# Patient Record
Sex: MALE | Race: WHITE | Employment: FULL TIME | URBAN - METROPOLITAN AREA
[De-identification: names, ages, dates, MRNs, and addresses within clinical notes are randomized per-mention and may not be internally consistent; named-entity substitution may affect disease eponyms.]

---

## 2017-07-09 ENCOUNTER — HOSPITAL ENCOUNTER (EMERGENCY)
Age: 32
Discharge: HOME | End: 2017-07-09
Attending: EMERGENCY MEDICINE
Payer: COMMERCIAL

## 2017-07-09 VITALS
OXYGEN SATURATION: 96 % | WEIGHT: 299.38 LBS | HEART RATE: 92 BPM | SYSTOLIC BLOOD PRESSURE: 130 MMHG | BODY MASS INDEX: 44.34 KG/M2 | DIASTOLIC BLOOD PRESSURE: 87 MMHG | HEIGHT: 69 IN | RESPIRATION RATE: 18 BRPM | TEMPERATURE: 98.3 F

## 2017-07-09 DIAGNOSIS — M54.50 ACUTE MIDLINE LOW BACK PAIN WITHOUT SCIATICA: ICD-10-CM
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
## 2017-07-09 DIAGNOSIS — L02.214 ABSCESS OF RIGHT GROIN: ICD-10-CM

## 2017-07-09 DIAGNOSIS — M54.6 ACUTE MIDLINE THORACIC BACK PAIN: Primary | ICD-10-CM

## 2017-07-09 DIAGNOSIS — R20.2 PARESTHESIA: ICD-10-CM

## 2017-07-09 PROCEDURE — 99283 EMERGENCY DEPT VISIT LOW MDM: CPT

## 2017-07-09 PROCEDURE — 75810000289 HC I&D ABSCESS SIMP/COMP/MULT

## 2017-07-09 PROCEDURE — 87205 SMEAR GRAM STAIN: CPT | Performed by: EMERGENCY MEDICINE

## 2017-07-09 PROCEDURE — 74011250637 HC RX REV CODE- 250/637: Performed by: EMERGENCY MEDICINE

## 2017-07-09 PROCEDURE — 77030019895 HC PCKNG STRP IODO -A

## 2017-07-09 PROCEDURE — 74011636637 HC RX REV CODE- 636/637: Performed by: EMERGENCY MEDICINE

## 2017-07-09 PROCEDURE — 74011000250 HC RX REV CODE- 250: Performed by: EMERGENCY MEDICINE

## 2017-07-09 RX ORDER — METHYLPREDNISOLONE 4 MG/1
TABLET ORAL
Qty: 1 DOSE PACK | Refills: 0 | Status: SHIPPED | OUTPATIENT
Start: 2017-07-09 | End: 2017-12-28

## 2017-07-09 RX ORDER — BACLOFEN 10 MG/1
10 TABLET ORAL AS NEEDED
COMMUNITY
End: 2021-09-18

## 2017-07-09 RX ORDER — DOXYCYCLINE HYCLATE 100 MG
100 TABLET ORAL EVERY 12 HOURS
Status: DISCONTINUED | OUTPATIENT
Start: 2017-07-09 | End: 2017-07-10 | Stop reason: HOSPADM

## 2017-07-09 RX ORDER — GABAPENTIN 300 MG/1
300 CAPSULE ORAL
COMMUNITY
End: 2021-09-18

## 2017-07-09 RX ORDER — LIDOCAINE HYDROCHLORIDE AND EPINEPHRINE 10; 10 MG/ML; UG/ML
5 INJECTION, SOLUTION INFILTRATION; PERINEURAL ONCE
Status: COMPLETED | OUTPATIENT
Start: 2017-07-09 | End: 2017-07-09

## 2017-07-09 RX ORDER — DOXYCYCLINE HYCLATE 100 MG
100 TABLET ORAL 2 TIMES DAILY
Qty: 14 TAB | Refills: 0 | Status: SHIPPED | OUTPATIENT
Start: 2017-07-09 | End: 2017-07-16

## 2017-07-09 RX ORDER — PREDNISONE 20 MG/1
60 TABLET ORAL
Status: COMPLETED | OUTPATIENT
Start: 2017-07-09 | End: 2017-07-09

## 2017-07-09 RX ADMIN — LIDOCAINE HYDROCHLORIDE,EPINEPHRINE BITARTRATE 50 MG: 10; .01 INJECTION, SOLUTION INFILTRATION; PERINEURAL at 22:34

## 2017-07-09 RX ADMIN — DOXYCYCLINE HYCLATE 100 MG: 100 TABLET, COATED ORAL at 23:19

## 2017-07-09 RX ADMIN — PREDNISONE 60 MG: 20 TABLET ORAL at 23:19

## 2017-07-09 NOTE — LETTER
[facility] EMERGENCY DEPT 
[address] [facility] [place] [address] [id number] 
[phone] Work/School Note Date: 7/9/2017 To Whom It May concern: 
 
Adry Jordan was seen and treated today in the emergency room by the following provider(s): 
Attending Provider: Jovanny Bee MD. Adry Jordan may return to work on in[place].  
 
Sincerely, 
 
 
 
 
Jovanny Bee MD

## 2017-07-10 NOTE — ED NOTES
The patient was discharged home by Dr. Jeanie Vuong in stable condition. The patient is alert and oriented, in no respiratory distress and discharge vital signs obtained. The patient's diagnosis, condition and treatment were explained. The patient expressed understanding. Two prescriptions given. A work note given. A discharge plan has been developed. A  was not involved in the process. Aftercare instructions were given. Pt ambulatory out of the ED.
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
## 2017-07-10 NOTE — ED TRIAGE NOTES
Pt ambulatory to treatment area, pt states \"I fell on my butt and Im having a lot of pain. \" Pt reports previous herniated disc that he takes baclofen and gabapentin for. Pt states that these medications did not help. Pt states \"Im in a lot of pain but I dont want pain medication. Denies loss of bowel or bladder fuction.

## 2017-07-10 NOTE — ED NOTES
Hourly rounding completed. Ongoing plan of care discussed and pts concerns/questions addressed. Call bell within reach; will continue to monitor.

## 2017-07-11 ENCOUNTER — HOSPITAL ENCOUNTER (EMERGENCY)
Age: 32
Discharge: HOME | End: 2017-07-11
Attending: EMERGENCY MEDICINE
Payer: COMMERCIAL

## 2017-07-11 VITALS
OXYGEN SATURATION: 96 % | BODY MASS INDEX: 44.14 KG/M2 | DIASTOLIC BLOOD PRESSURE: 85 MMHG | SYSTOLIC BLOOD PRESSURE: 133 MMHG | TEMPERATURE: 98.3 F | HEART RATE: 91 BPM | HEIGHT: 69 IN | WEIGHT: 298 LBS | RESPIRATION RATE: 20 BRPM

## 2017-07-11 DIAGNOSIS — L02.91 ABSCESS: Primary | ICD-10-CM

## 2017-07-11 PROCEDURE — 74011000250 HC RX REV CODE- 250: Performed by: PHYSICIAN ASSISTANT

## 2017-07-11 PROCEDURE — 75810000275 HC EMERGENCY DEPT VISIT NO LEVEL OF CARE

## 2017-07-11 RX ORDER — BACITRACIN 500 UNIT/G
1 PACKET (EA) TOPICAL
Status: COMPLETED | OUTPATIENT
Start: 2017-07-11 | End: 2017-07-11

## 2017-07-11 RX ADMIN — BACITRACIN 1 PACKET: 500 OINTMENT TOPICAL at 17:30

## 2017-07-11 NOTE — ED NOTES
Pt was discharged and given instructions by Krishna Pendleton. Pt verbalized good understanding of all discharge instructions,prescriptions and F/U care. All questions answered. Pt in stable condition on discharge.

## 2017-07-11 NOTE — ED PROVIDER NOTES
HPI Comments: 34year old male presenting for abscess wound check. Denies increasing pain, drainage, fever. Notes that amount of drainage on dressing has significantly improved in the last 24 hours. No fever or other systemic symptoms. PMHx: HTN, back pain  Social: + tobacco use    Patient is a 34 y.o. male presenting with wound check. The history is provided by the patient. Wound Check    This is a new problem. Episode onset: had I&D done here 2 days ago. The problem has been rapidly improving. Pain location: right upper inner thigh. The pain is at a severity of 3/10. The pain is mild. The symptoms are aggravated by palpation. Past Medical History:   Diagnosis Date    Hypertension     Ill-defined condition 03/2017    herniated disk    Ill-defined condition 03/2017    T-4 cracks leaking fluid, but no rupture       History reviewed. No pertinent surgical history. History reviewed. No pertinent family history. Social History     Social History    Marital status: LEGALLY      Spouse name: N/A    Number of children: N/A    Years of education: N/A     Occupational History    Not on file. Social History Main Topics    Smoking status: Current Every Day Smoker     Packs/day: 0.50    Smokeless tobacco: Former User    Alcohol use Yes      Comment: occasionally    Drug use: No    Sexual activity: Not on file     Other Topics Concern    Not on file     Social History Narrative         ALLERGIES: Codeine; Phenergan [promethazine]; and Toradol [ketorolac tromethamine]    Review of Systems   Constitutional: Negative for chills and fever. Cardiovascular: Negative for chest pain. Gastrointestinal: Negative for vomiting. Skin: Positive for wound. Negative for color change. All other systems reviewed and are negative.       Vitals:    07/11/17 1635   BP: 133/85   Pulse: 91   Resp: 20   Temp: 98.3 °F (36.8 °C)   SpO2: 96%   Weight: 135.2 kg (298 lb)   Height: 5' 9\" (1.753 m) Physical Exam   Constitutional: He is oriented to person, place, and time. He appears well-developed and well-nourished. No distress. Pleasant WM   HENT:   Head: Normocephalic and atraumatic. Right Ear: External ear normal.   Left Ear: External ear normal.   Eyes: Conjunctivae are normal. No scleral icterus. Neck: Neck supple. No tracheal deviation present. Cardiovascular: Normal rate. Pulmonary/Chest: Effort normal. No stridor. No respiratory distress. Abdominal: Soft. He exhibits no distension. Musculoskeletal: Normal range of motion. Neurological: He is alert and oriented to person, place, and time. Skin: Skin is warm and dry. Abscess cavity with packing to the right upper inner thigh. Appears to be healing well. No erythema. No fluctuance. Small cavity without drainage noted. Psychiatric: He has a normal mood and affect. His behavior is normal.   Nursing note and vitals reviewed. MDM  Number of Diagnoses or Management Options  Abscess:   Diagnosis management comments: 34year old male presenting to the ED for abscess check. Had I&D here two days ago with packing placed. Reports feeling better. Appears to be healing well. Discussed with patient local wound care, return precautions, PCP f/u. Amount and/or Complexity of Data Reviewed  Discuss the patient with other providers: yes (Dr. Kristie Guadarrama, ED attending)      ED Course       Procedures      Packing removed from wound. No surrounding erythema, warmth, or tenderness. No drainage in the cavity. Dressing applied last night with small, dime sized area of drainage. Interior appears pink and to be healing well.   MG Ryder  5:51 PM

## 2017-07-11 NOTE — ED TRIAGE NOTES
Pt ambulated to the treatment area with a steady gait. Pt states \"I had a boil in my upper right leg treated Sunday I am here to have the packing removed and wound checked. \" Pt denies fever.

## 2017-07-11 NOTE — DISCHARGE INSTRUCTIONS
We hope that we have addressed all of your medical concerns. The examination and treatment you received in the Emergency Department were for an emergent problem and were not intended as complete care. It is important that you follow up with your healthcare provider(s) for ongoing care. If your symptoms worsen or do not improve as expected, and you are unable to reach your usual health care provider(s), you should return to the Emergency Department. Today's healthcare is undergoing tremendous change, and patient satisfaction surveys are one of the many tools to assess the quality of medical care. You may receive a survey from the [employer] regarding your experience in the Emergency Department. I hope that your experience has been completely positive, particularly the medical care that I provided. As such, please participate in the survey; anything less than excellent does not meet my expectations or intentions. [facility] and [facility] participate in nationally recognized quality of care measures. If your blood pressure is greater than 120/80, as reported below, we urge that you seek medical care to address the potential of high blood pressure, commonly known as hypertension. Hypertension can be hereditary or can be caused by certain medical conditions, pain, stress, or \"white coat syndrome. \"       Please make an appointment with your health care provider(s) for follow up of your Emergency Department visit. VITALS:   Patient Vitals for the past 8 hrs:   Temp Pulse Resp BP SpO2   07/11/17 1635 98.3 °F (36.8 °C) 91 20 133/85 96 %          Thank you for allowing us to provide you with medical care today. We realize that you have many choices for your emergency care needs. Please choose us in the future for any continued health care needs. Jose Antunez, [address] [place] [address].   Office: [phone]            No results found for this or any previous visit (from the past 24 hour(s)). No results found. Skin Abscess: Care Instructions  Your Care Instructions    A skin abscess is a bacterial infection that forms a pocket of pus. A boil is a kind of skin abscess. The doctor may have cut an opening in the abscess so that the pus can drain out. You may have gauze in the cut so that the abscess will stay open and keep draining. You may need antibiotics. You will need to follow up with your doctor to make sure the infection has gone away. The doctor has checked you carefully, but problems can develop later. If you notice any problems or new symptoms, get medical treatment right away. Follow-up care is a key part of your treatment and safety. Be sure to make and go to all appointments, and call your doctor if you are having problems. It's also a good idea to know your test results and keep a list of the medicines you take. How can you care for yourself at home? · Apply warm and dry compresses, a heating pad set on low, or a hot water bottle 3 or 4 times a day for pain. Keep a cloth between the heat source and your skin. · If your doctor prescribed antibiotics, take them as directed. Do not stop taking them just because you feel better. You need to take the full course of antibiotics. · Take pain medicines exactly as directed. ¨ If the doctor gave you a prescription medicine for pain, take it as prescribed. ¨ If you are not taking a prescription pain medicine, ask your doctor if you can take an over-the-counter medicine. · Keep your bandage clean and dry. Change the bandage whenever it gets wet or dirty, or at least one time a day. · If the abscess was packed with gauze:  ¨ Keep follow-up appointments to have the gauze changed or removed. If the doctor instructed you to remove the gauze, gently pull out all of the gauze when your doctor tells you to.   ¨ After the gauze is removed, soak the area in warm water for 15 to 20 minutes 2 times a day, until the wound closes. When should you call for help? Call your doctor now or seek immediate medical care if:  · You have signs of worsening infection, such as:  ¨ Increased pain, swelling, warmth, or redness. ¨ Red streaks leading from the infected skin. ¨ Pus draining from the wound. ¨ A fever. Watch closely for changes in your health, and be sure to contact your doctor if:  · You do not get better as expected. Where can you learn more? Go to http://eusebio-jess.info/. Enter H475 in the search box to learn more about \"Skin Abscess: Care Instructions. \"  Current as of: October 13, 2016  Content Version: 11.3  © 6336-1745 [employer]. Care instructions adapted under license by [employer] (which disclaims liability or warranty for this information). If you have questions about a medical condition or this instruction, always ask your healthcare professional. [address] any warranty or liability for your use of this information.

## 2017-07-13 LAB
BACTERIA SPEC CULT: ABNORMAL
BACTERIA SPEC CULT: ABNORMAL
GRAM STN SPEC: ABNORMAL
GRAM STN SPEC: ABNORMAL
SERVICE CMNT-IMP: ABNORMAL

## 2017-12-28 ENCOUNTER — HOSPITAL ENCOUNTER (EMERGENCY)
Age: 32
Discharge: HOME | End: 2017-12-28
Attending: STUDENT IN AN ORGANIZED HEALTH CARE EDUCATION/TRAINING PROGRAM
Payer: COMMERCIAL

## 2017-12-28 ENCOUNTER — APPOINTMENT (OUTPATIENT)
Dept: GENERAL RADIOLOGY | Age: 32
End: 2017-12-28
Attending: PHYSICIAN ASSISTANT
Payer: COMMERCIAL

## 2017-12-28 VITALS
DIASTOLIC BLOOD PRESSURE: 72 MMHG | WEIGHT: 315 LBS | HEART RATE: 92 BPM | TEMPERATURE: 98.3 F | BODY MASS INDEX: 46.65 KG/M2 | OXYGEN SATURATION: 94 % | HEIGHT: 69 IN | SYSTOLIC BLOOD PRESSURE: 118 MMHG | RESPIRATION RATE: 18 BRPM

## 2017-12-28 DIAGNOSIS — J20.9 ACUTE BRONCHITIS, UNSPECIFIED ORGANISM: ICD-10-CM

## 2017-12-28 DIAGNOSIS — R07.9 CHEST PAIN IN ADULT: Primary | ICD-10-CM

## 2017-12-28 DIAGNOSIS — F17.200 NICOTINE DEPENDENCE, UNCOMPLICATED, UNSPECIFIED NICOTINE PRODUCT TYPE: ICD-10-CM

## 2017-12-28 LAB
ANION GAP BLD CALC-SCNC: 13 MMOL/L (ref 5–15)
ATRIAL RATE: 88 BPM
BASOPHILS # BLD: 0.1 K/UL (ref 0–0.1)
BASOPHILS NFR BLD: 1 % (ref 0–1)
BUN BLD-MCNC: 29 MG/DL (ref 9–20)
CA-I BLD-MCNC: 1.14 MMOL/L (ref 1.12–1.32)
CALCULATED P AXIS, ECG09: 49 DEGREES
CALCULATED R AXIS, ECG10: -10 DEGREES
CALCULATED T AXIS, ECG11: 49 DEGREES
CHLORIDE BLD-SCNC: 103 MMOL/L (ref 98–107)
CO2 BLD-SCNC: 28 MMOL/L (ref 21–32)
CREAT BLD-MCNC: 1 MG/DL (ref 0.6–1.3)
DIAGNOSIS, 93000: NORMAL
DIFFERENTIAL METHOD BLD: NORMAL
EOSINOPHIL # BLD: 0.3 K/UL (ref 0–0.4)
EOSINOPHIL NFR BLD: 3 % (ref 0–7)
ERYTHROCYTE [DISTWIDTH] IN BLOOD BY AUTOMATED COUNT: 13.1 % (ref 11.5–14.5)
GLUCOSE BLD-MCNC: 98 MG/DL (ref 65–100)
HCT VFR BLD AUTO: 45.8 % (ref 36.6–50.3)
HCT VFR BLD CALC: 45 % (ref 36.6–50.3)
HGB BLD-MCNC: 15.2 G/DL (ref 12.1–17)
HGB BLD-MCNC: 15.3 GM/DL (ref 12.1–17)
LYMPHOCYTES # BLD: 2.2 K/UL
LYMPHOCYTES NFR BLD: 23 % (ref 12–49)
MCH RBC QN AUTO: 29.2 PG (ref 26–34)
MCHC RBC AUTO-ENTMCNC: 33.2 G/DL (ref 30–36.5)
MCV RBC AUTO: 87.9 FL (ref 80–99)
MONOCYTES # BLD: 0.8 K/UL (ref 0–1)
MONOCYTES NFR BLD: 9 % (ref 5–13)
NEUTS SEG # BLD: 6.4 K/UL (ref 1.8–8)
NEUTS SEG NFR BLD: 64 % (ref 32–75)
P-R INTERVAL, ECG05: 142 MS
PLATELET # BLD AUTO: 276 K/UL (ref 150–400)
POTASSIUM BLD-SCNC: 4.8 MMOL/L (ref 3.5–5.1)
Q-T INTERVAL, ECG07: 364 MS
QRS DURATION, ECG06: 86 MS
QTC CALCULATION (BEZET), ECG08: 440 MS
RBC # BLD AUTO: 5.21 M/UL (ref 4.1–5.7)
SERVICE CMNT-IMP: ABNORMAL
SODIUM BLD-SCNC: 137 MMOL/L (ref 136–145)
TROPONIN I BLD-MCNC: <0.04 NG/ML (ref 0–0.08)
VENTRICULAR RATE, ECG03: 88 BPM
WBC # BLD AUTO: 9.7 K/UL (ref 4.1–11.1)

## 2017-12-28 PROCEDURE — 80047 BASIC METABLC PNL IONIZED CA: CPT

## 2017-12-28 PROCEDURE — 77030013140 HC MSK NEB VYRM -A

## 2017-12-28 PROCEDURE — 85025 COMPLETE CBC W/AUTO DIFF WBC: CPT | Performed by: PHYSICIAN ASSISTANT

## 2017-12-28 PROCEDURE — 94762 N-INVAS EAR/PLS OXIMTRY CONT: CPT

## 2017-12-28 PROCEDURE — 74011250637 HC RX REV CODE- 250/637: Performed by: PHYSICIAN ASSISTANT

## 2017-12-28 PROCEDURE — 36415 COLL VENOUS BLD VENIPUNCTURE: CPT | Performed by: PHYSICIAN ASSISTANT

## 2017-12-28 PROCEDURE — 71020 XR CHEST PA LAT: CPT

## 2017-12-28 PROCEDURE — 74011000250 HC RX REV CODE- 250: Performed by: PHYSICIAN ASSISTANT

## 2017-12-28 PROCEDURE — 94640 AIRWAY INHALATION TREATMENT: CPT

## 2017-12-28 PROCEDURE — 99285 EMERGENCY DEPT VISIT HI MDM: CPT

## 2017-12-28 PROCEDURE — 84484 ASSAY OF TROPONIN QUANT: CPT

## 2017-12-28 PROCEDURE — 93005 ELECTROCARDIOGRAM TRACING: CPT

## 2017-12-28 RX ORDER — SODIUM CHLORIDE 0.9 % (FLUSH) 0.9 %
5-10 SYRINGE (ML) INJECTION AS NEEDED
Status: DISCONTINUED | OUTPATIENT
Start: 2017-12-28 | End: 2017-12-28 | Stop reason: HOSPADM

## 2017-12-28 RX ORDER — VARENICLINE TARTRATE 1 MG/1
1 TABLET, FILM COATED ORAL
COMMUNITY
End: 2018-07-10

## 2017-12-28 RX ORDER — AMLODIPINE BESYLATE 5 MG/1
5 TABLET ORAL
COMMUNITY
End: 2021-09-18

## 2017-12-28 RX ORDER — GUAIFENESIN 100 MG/5ML
325 LIQUID (ML) ORAL
Status: COMPLETED | OUTPATIENT
Start: 2017-12-28 | End: 2017-12-28

## 2017-12-28 RX ORDER — ALBUTEROL SULFATE 90 UG/1
2 AEROSOL, METERED RESPIRATORY (INHALATION)
Qty: 1 INHALER | Refills: 1 | Status: SHIPPED | OUTPATIENT
Start: 2017-12-28 | End: 2021-09-18

## 2017-12-28 RX ADMIN — ALBUTEROL SULFATE 1 DOSE: 2.5 SOLUTION RESPIRATORY (INHALATION) at 13:22

## 2017-12-28 RX ADMIN — ASPIRIN 81 MG 324 MG: 81 TABLET ORAL at 12:09

## 2017-12-28 RX ADMIN — ALBUTEROL SULFATE 1 DOSE: 2.5 SOLUTION RESPIRATORY (INHALATION) at 12:10

## 2017-12-28 NOTE — ED NOTES
Patient discharged by [facility]. Patient has no questions regarding discharge at this time. IV removed without difficulty.

## 2017-12-28 NOTE — ED NOTES
Patient also reports a cold for about two weeks. Patient taking dayquil for the past week for symptoms.

## 2017-12-28 NOTE — ED NOTES
Patient has completed seconded ordered breathing treatment. Patient ambulatory to ED restroom, no assistance needed.

## 2017-12-28 NOTE — ED PROVIDER NOTES
HPI Comments: Fonda Goldmann is a 34 y.o. male who presents ambulatory to the ED with a c/o sharp chest pain to his left chest starting four hours pta. It has been constant and worsened with radiation to his left shoulder while enroute to the ER. He notes he has been having intermittent sob. Pt reports he has been having a dry cough for 2 weeks. He has been taking dayquil and has decreased his smoking from 1.5 ppd down to 0.5 ppd over the last 1.5 weeks ago. He denies f/c, n/v/d, diaphoresis or urinary sx. Pt states his father just had \"a massive heart attack\"  A little over a month ago. He denies tx for his cp pta. Pt denies recent travel, prolonged sitting, recent procedures or injury. He denies leg pain    PCP: [facility]  PMHx significant for: Past Medical History:  No date: Hypertension  03/2017: Ill-defined condition      Comment: herniated disk  03/2017: Ill-defined condition      Comment: T-4 cracks leaking fluid, but no rupture  PSHx significant for: History reviewed. No pertinent surgical history. Social Hx: Tobacco: down to 0.5 ppd  EtOH: quit 1.5 years ago Illicit drug use: denies    There are no further complaints or symptoms at this time. Past Medical History:   Diagnosis Date    Hypertension     Ill-defined condition 03/2017    herniated disk    Ill-defined condition 03/2017    T-4 cracks leaking fluid, but no rupture       History reviewed. No pertinent surgical history. History reviewed. No pertinent family history. Social History     Social History    Marital status:      Spouse name: N/A    Number of children: N/A    Years of education: N/A     Occupational History    Not on file.      Social History Main Topics    Smoking status: Current Every Day Smoker     Packs/day: 0.50    Smokeless tobacco: Former User    Alcohol use No      Comment: denies drinking for a year and a half    Drug use: No    Sexual activity: Not on file     Other Topics Concern    Not on file     Social History Narrative         ALLERGIES: Codeine; Phenergan [promethazine]; and Toradol [ketorolac tromethamine]    Review of Systems   Constitutional: Negative for chills and fever. HENT: Negative for congestion, rhinorrhea, sneezing and sore throat. Eyes: Negative for redness and visual disturbance. Respiratory: Positive for cough and shortness of breath. Cardiovascular: Positive for chest pain. Negative for leg swelling. Gastrointestinal: Negative for abdominal pain, nausea and vomiting. Genitourinary: Negative for difficulty urinating and frequency. Musculoskeletal: Negative for back pain, myalgias and neck stiffness. Skin: Negative for rash. Neurological: Negative for dizziness, syncope, weakness and headaches. Hematological: Negative for adenopathy. Patient Vitals for the past 12 hrs:   Temp Pulse Resp BP SpO2   12/28/17 1422 - 92 18 - 94 %   12/28/17 1345 - 94 - 118/72 94 %   12/28/17 1336 - 90 13 - 94 %   12/28/17 1315 - 85 18 120/65 95 %   12/28/17 1304 - 85 15 - 94 %   12/28/17 1300 - 93 24 127/74 91 %   12/28/17 1245 - 94 21 119/72 92 %   12/28/17 1240 - 94 18 - 94 %   12/28/17 1230 - 89 28 116/72 92 %   12/28/17 1223 - 93 27 116/72 92 %   12/28/17 1159 - 96 16 - 95 %   12/28/17 1149 98.3 °F (36.8 °C) 95 21 136/88 94 %              Physical Exam   Constitutional: He is oriented to person, place, and time. He appears well-developed and well-nourished. No distress. HENT:   Head: Normocephalic and atraumatic. Right Ear: External ear normal.   Left Ear: External ear normal.   Eyes: EOM are normal. Pupils are equal, round, and reactive to light. Neck: Neck supple. Cardiovascular: Normal rate, regular rhythm, normal heart sounds and intact distal pulses. Exam reveals no gallop and no friction rub. No murmur heard. Pulmonary/Chest: Effort normal. No stridor. No respiratory distress. He has no wheezes. He has no rales.  He exhibits no tenderness. Diminished breath sounds throughout   Abdominal: Soft. Bowel sounds are normal. He exhibits no distension and no mass. There is no tenderness. There is no rebound and no guarding. Musculoskeletal: Normal range of motion. He exhibits no edema, tenderness or deformity. Neurological: He is alert and oriented to person, place, and time. No cranial nerve deficit. Coordination normal.   Skin: No rash noted. No erythema. No pallor. Psychiatric: He has a normal mood and affect. His behavior is normal.   Nursing note and vitals reviewed. MDM  Number of Diagnoses or Management Options     Amount and/or Complexity of Data Reviewed  Clinical lab tests: ordered and reviewed  Tests in the radiology section of CPT®: ordered and reviewed  Tests in the medicine section of CPT®: reviewed and ordered  Review and summarize past medical records: yes  Independent visualization of images, tracings, or specimens: yes    Patient Progress  Patient progress: stable    ED Course       Procedures  11:50 AM  Discussed with the patient the medical risks of prolonged smoking habits and advised the patient of the benefits of the cessation of smoking. The patient verbalized their understanding. MG Browne    ED EKG interpretation: 11:47 AM  Rhythm: normal sinus rhythm with sinus arrhythmia; and irregular. Rate (approx.): 88; Axis: normal; P wave: normal; QRS interval: normal ; ST/T wave: normal; Other findings: otherwise normal EKG. This EKG was interpreted by No att. providers found,ED Provider. 11:52 PM  Discussed pt, sx, hx and current findings with Dr Latanya Howard. He is in agreement with plan and will see pt. Will cardiac labs and tx for respiratory sx. Kaci Kwame. CLYDE Gonzalez    1:17 PM   Updated pt on current findings. Rhonchi at bases. Will repeat neb. Kaci Puente. CLYDE Gonzalez       2:23 PM   Feels better. Will discharge  Kaci Kwame.  CLYDE Gonzalez    LABORATORY TESTS:  Recent Results (from the past 12 hour(s)) EKG, 12 LEAD, INITIAL    Collection Time: 12/28/17 11:47 AM   Result Value Ref Range    Ventricular Rate 88 BPM    Atrial Rate 88 BPM    P-R Interval 142 ms    QRS Duration 86 ms    Q-T Interval 364 ms    QTC Calculation (Bezet) 440 ms    Calculated P Axis 49 degrees    Calculated R Axis -10 degrees    Calculated T Axis 49 degrees    Diagnosis       Sinus rhythm with marked sinus arrhythmia  Otherwise normal ECG  No previous ECGs available  Confirmed by ZULEMA Naranjo MD ([id number]) on 12/28/2017 4:13:11 PM     CBC WITH AUTOMATED DIFF    Collection Time: 12/28/17 11:58 AM   Result Value Ref Range    WBC 9.7 4.1 - 11.1 K/uL    RBC 5.21 4.10 - 5.70 M/uL    HGB 15.2 12.1 - 17.0 g/dL    HCT 45.8 36.6 - 50.3 %    MCV 87.9 80.0 - 99.0 FL    MCH 29.2 26.0 - 34.0 PG    MCHC 33.2 30.0 - 36.5 g/dL    RDW 13.1 11.5 - 14.5 %    PLATELET 659 109 - 484 K/uL    NEUTROPHILS 64 32 - 75 %    LYMPHOCYTES 23 12 - 49 %    MONOCYTES 9 5 - 13 %    EOSINOPHILS 3 0 - 7 %    BASOPHILS 1 0 - 1 %    ABS. NEUTROPHILS 6.4 1.8 - 8.0 K/UL    ABS. LYMPHOCYTES 2.2 K/UL    ABS. MONOCYTES 0.8 0.0 - 1.0 K/UL    ABS. EOSINOPHILS 0.3 0.0 - 0.4 K/UL    ABS.  BASOPHILS 0.1 0.0 - 0.1 K/UL    DF AUTOMATED     POC TROPONIN-I    Collection Time: 12/28/17 12:00 PM   Result Value Ref Range    Troponin-I (POC) <0.04 0.00 - 0.08 ng/mL   POC CHEM8    Collection Time: 12/28/17 12:03 PM   Result Value Ref Range    Calcium, ionized (POC) 1.14 1.12 - 1.32 MMOL/L    Sodium (POC) 137 136 - 145 MMOL/L    Potassium (POC) 4.8 3.5 - 5.1 MMOL/L    Chloride (POC) 103 98 - 107 MMOL/L    CO2 (POC) 28 21 - 32 MMOL/L    Anion gap (POC) 13 5 - 15 mmol/L    Glucose (POC) 98 65 - 100 MG/DL    BUN (POC) 29 (H) 9 - 20 MG/DL    Creatinine (POC) 1.0 0.6 - 1.3 MG/DL    GFRAA, POC >60 >60 ml/min/1.73m2    GFRNA, POC >60 >60 ml/min/1.73m2    Hemoglobin (POC) 15.3 12.1 - 17.0 GM/DL    Hematocrit (POC) 45 36.6 - 50.3 %    Comment Notified RN or MD immediately by          IMAGING RESULTS:    Xr Chest Pa Lat    Result Date: 12/28/2017  Exam:  2 view chest Indication: Left chest pain today Comparison to 2/4/2016. PA and lateral views demonstrate normal heart size. There is no acute process in the lung fields. The osseous structures are unremarkable. Impression: No acute process or change compared to the prior exam.       MEDICATIONS GIVEN:  Medications   sodium chloride (NS) flush 5-10 mL (not administered)   aspirin chewable tablet 324 mg (324 mg Oral Given 12/28/17 1209)   albuterol 5mg / ipratropium 0.5mg neb solution (1 Dose Nebulization Given 12/28/17 1210)   albuterol 5mg / ipratropium 0.5mg neb solution (1 Dose Nebulization Given 12/28/17 1322)       IMPRESSION:  1. Chest pain in adult    2. Acute bronchitis, unspecified organism    3. Nicotine dependence, uncomplicated, unspecified nicotine product type        PLAN:  1. Discharge Medication List as of 12/28/2017  2:23 PM      START taking these medications    Details   albuterol (PROVENTIL HFA, VENTOLIN HFA, PROAIR HFA) 90 mcg/actuation inhaler Take 2 Puffs by inhalation every four (4) hours as needed for Wheezing., Print, Disp-1 Inhaler, R-1      guaiFENesin-dextromethorphan SR (MUCINEX DM) 600-30 mg per tablet Take 1 Tab by mouth two (2) times a day., Print, Disp-20 Tab, R-0         CONTINUE these medications which have NOT CHANGED    Details   varenicline (CHANTIX) 1 mg tablet Take 1 mg by mouth two (2) times daily (after meals). , Historical Med      amLODIPine (NORVASC) 5 mg tablet Take 5 mg by mouth daily. , Historical Med      gabapentin (NEURONTIN) 300 mg capsule Take 300 mg by mouth three (3) times daily. Indications: pt takes it as needed, doesn't take it at work because it makes him drowsy, Historical Med      baclofen (LIORESAL) 10 mg tablet Take 10 mg by mouth as needed., Historical Med      INDOMETHACIN (INDOCIN PO) Take  by mouth., Historical Med           2.    Follow-up Information     Follow up With Details Comments Contact Info    [facility] Schedule an appointment as soon as possible for a visit 2-4 days for recheck [facility]          Return to ED if worse     2:24 PM  Pt has been reexamined. Pt has no new complaints, changes or physical findings. Care plan outlined and precautions discussed. All available results were reviewed with pt. All medications were reviewed with pt. All of pt's questions and concerns were addressed. Pt agrees to F/U as instructed and agrees to return to ED upon further deterioration. Pt is ready to go home.   MG Hung

## 2017-12-28 NOTE — DISCHARGE INSTRUCTIONS
Bronchitis: Care Instructions  Your Care Instructions    Bronchitis is inflammation of the bronchial tubes, which carry air to the lungs. The tubes swell and produce mucus, or phlegm. The mucus and inflamed bronchial tubes make you cough. You may have trouble breathing. Most cases of bronchitis are caused by viruses like those that cause colds. Antibiotics usually do not help and they may be harmful. Bronchitis usually develops rapidly and lasts about 2 to 3 weeks in otherwise healthy people. Follow-up care is a key part of your treatment and safety. Be sure to make and go to all appointments, and call your doctor if you are having problems. It's also a good idea to know your test results and keep a list of the medicines you take. How can you care for yourself at home? · Take all medicines exactly as prescribed. Call your doctor if you think you are having a problem with your medicine. · Get some extra rest.  · Take an over-the-counter pain medicine, such as acetaminophen (Tylenol), ibuprofen (Advil, Motrin), or naproxen (Aleve) to reduce fever and relieve body aches. Read and follow all instructions on the label. · Do not take two or more pain medicines at the same time unless the doctor told you to. Many pain medicines have acetaminophen, which is Tylenol. Too much acetaminophen (Tylenol) can be harmful. · Take an over-the-counter cough medicine that contains dextromethorphan to help quiet a dry, hacking cough so that you can sleep. Avoid cough medicines that have more than one active ingredient. Read and follow all instructions on the label. · Breathe moist air from a humidifier, hot shower, or sink filled with hot water. The heat and moisture will thin mucus so you can cough it out. · Do not smoke. Smoking can make bronchitis worse. If you need help quitting, talk to your doctor about stop-smoking programs and medicines. These can increase your chances of quitting for good.   When should you call for help? Call 911 anytime you think you may need emergency care. For example, call if:  ? · You have severe trouble breathing. ?Call your doctor now or seek immediate medical care if:  ? · You have new or worse trouble breathing. ? · You cough up dark brown or bloody mucus (sputum). ? · You have a new or higher fever. ? · You have a new rash. ? Watch closely for changes in your health, and be sure to contact your doctor if:  ? · You cough more deeply or more often, especially if you notice more mucus or a change in the color of your mucus. ? · You are not getting better as expected. Where can you learn more? Go to http://eusebio-jess.info/. Enter H333 in the search box to learn more about \"Bronchitis: Care Instructions. \"  Current as of: May 12, 2017  Content Version: 11.4  © 0599-4050 [employer] Care instructions adapted under license by [employer] (which disclaims liability or warranty for this information). If you have questions about a medical condition or this instruction, always ask your healthcare professional. [place] [address] any warranty or liability for your use of this information. Chest Pain: Care Instructions  Your Care Instructions    There are many things that can cause chest pain. Some are not serious and will get better on their own in a few days. But some kinds of chest pain need more testing and treatment. Your doctor may have recommended a follow-up visit in the next 8 to 12 hours. If you are not getting better, you may need more tests or treatment. Even though your doctor has released you, you still need to watch for any problems. The doctor carefully checked you, but sometimes problems can develop later. If you have new symptoms or if your symptoms do not get better, get medical care right away.   If you have worse or different chest pain or pressure that lasts more than 5 minutes or you passed out (lost consciousness), call 911 or seek other emergency help right away. A medical visit is only one step in your treatment. Even if you feel better, you still need to do what your doctor recommends, such as going to all suggested follow-up appointments and taking medicines exactly as directed. This will help you recover and help prevent future problems. How can you care for yourself at home? · Rest until you feel better. · Take your medicine exactly as prescribed. Call your doctor if you think you are having a problem with your medicine. · Do not drive after taking a prescription pain medicine. When should you call for help? Call 911 if:  ? · You passed out (lost consciousness). ? · You have severe difficulty breathing. ? · You have symptoms of a heart attack. These may include:  ¨ Chest pain or pressure, or a strange feeling in your chest.  ¨ Sweating. ¨ Shortness of breath. ¨ Nausea or vomiting. ¨ Pain, pressure, or a strange feeling in your back, neck, jaw, or upper belly or in one or both shoulders or arms. ¨ Lightheadedness or sudden weakness. ¨ A fast or irregular heartbeat. After you call 911, the  may tell you to chew 1 adult-strength or 2 to 4 low-dose aspirin. Wait for an ambulance. Do not try to drive yourself. ?Call your doctor today if:  ? · You have any trouble breathing. ? · Your chest pain gets worse. ? · You are dizzy or lightheaded, or you feel like you may faint. ? · You are not getting better as expected. ? · You are having new or different chest pain. Where can you learn more? Go to http://eusebio-jess.info/. Enter A120 in the search box to learn more about \"Chest Pain: Care Instructions. \"  Current as of: March 20, 2017  Content Version: 11.4  © 5490-5565 [employer]. Care instructions adapted under license by [employer] (which disclaims liability or warranty for this information).  If you have questions about a medical condition or this instruction, always ask your healthcare professional. [address] any warranty or liability for your use of this information. Learning About Benefits From Quitting Smoking  How does quitting smoking make you healthier? If you're thinking about quitting smoking, you may have a few reasons to be smoke-free. Your health may be one of them. · When you quit smoking, you lower your risks for cancer, lung disease, heart attack, stroke, blood vessel disease, and blindness from macular degeneration. · When you're smoke-free, you get sick less often, and you heal faster. You are less likely to get colds, flu, bronchitis, and pneumonia. · As a nonsmoker, you may find that your mood is better and you are less stressed. When and how will you feel healthier? Quitting has real health benefits that start from day 1 of being smoke-free. And the longer you stay smoke-free, the healthier you get and the better you feel. The first hours  · After just 20 minutes, your blood pressure and heart rate go down. That means there's less stress on your heart and blood vessels. · Within 12 hours, the level of carbon monoxide in your blood drops back to normal. That makes room for more oxygen. With more oxygen in your body, you may notice that you have more energy than when you smoked. After 2 weeks  · Your lungs start to work better. · Your risk of heart attack starts to drop. After 1 month  · When your lungs are clear, you cough less and breathe deeper, so it's easier to be active. · Your sense of taste and smell return. That means you can enjoy food more than you have since you started smoking. Over the years  · After 1 year, your risk of heart disease is half what it would be if you kept smoking. · After 5 years, your risk of stroke starts to shrink. Within a few years after that, it's about the same as if you'd never smoked.   · After 10 years, your risk of dying from lung cancer is cut by about half. And your risk for many other types of cancer is lower too. How would quitting help others in your life? When you quit smoking, you improve the health of everyone who now breathes in your smoke. · Their heart, lung, and cancer risks drop, much like yours. · They are sick less. For babies and small children, living smoke-free means they're less likely to have ear infections, pneumonia, and bronchitis. · If you're a woman who is or will be pregnant someday, quitting smoking means a healthier . · Children who are close to you are less likely to become adult smokers. Where can you learn more? Go to http://eusebioBirdpostjess.info/. Enter [id number] [id number] [id number] in the search box to learn more about \"Learning About Benefits From Quitting Smoking. \"  Current as of: 2017  Content Version: 11.4  © 7873-0502 [employer]. Care instructions adapted under license by [employer] (which disclaims liability or warranty for this information). If you have questions about a medical condition or this instruction, always ask your healthcare professional. [place] [address] any warranty or liability for your use of this information. We hope that we have addressed all of your medical concerns. The examination and treatment you received in the Emergency Department were for an emergent problem and were not intended as complete care. It is important that you follow up with your healthcare provider(s) for ongoing care. If your symptoms worsen or do not improve as expected, and you are unable to reach your usual health care provider(s), you should return to the Emergency Department. Today's healthcare is undergoing tremendous change, and patient satisfaction surveys are one of the many tools to assess the quality of medical care. You may receive a survey from the [employer] regarding your experience in the Emergency Department.   I hope that your experience has been completely positive, particularly the medical care that I provided. As such, please participate in the survey; anything less than excellent does not meet my expectations or intentions. [facility] and [facility] participate in nationally recognized quality of care measures. If your blood pressure is greater than 120/80, as reported below, we urge that you seek medical care to address the potential of high blood pressure, commonly known as hypertension. Hypertension can be hereditary or can be caused by certain medical conditions, pain, stress, or \"white coat syndrome. \"       Please make an appointment with your health care provider(s) for follow up of your Emergency Department visit. VITALS:   Patient Vitals for the past 8 hrs:   Temp Pulse Resp BP SpO2   12/28/17 1422 - 92 18 - 94 %   12/28/17 1345 - 94 - 118/72 94 %   12/28/17 1336 - 90 13 - 94 %   12/28/17 1315 - 85 18 120/65 95 %   12/28/17 1304 - 85 15 - 94 %   12/28/17 1300 - 93 24 127/74 91 %   12/28/17 1245 - 94 21 119/72 92 %   12/28/17 1240 - 94 18 - 94 %   12/28/17 1230 - 89 28 116/72 92 %   12/28/17 1223 - 93 27 116/72 92 %   12/28/17 1159 - 96 16 - 95 %   12/28/17 1149 98.3 °F (36.8 °C) 95 21 136/88 94 %          Thank you for allowing us to provide you with medical care today. We realize that you have many choices for your emergency care needs. Please choose us in the future for any continued health care needs. Josiah Gonzalez, [address]: [phone]            Recent Results (from the past 24 hour(s))   EKG, 12 LEAD, INITIAL    Collection Time: 12/28/17 11:47 AM   Result Value Ref Range    Ventricular Rate 88 BPM    Atrial Rate 88 BPM    P-R Interval 142 ms    QRS Duration 86 ms    Q-T Interval 364 ms    QTC Calculation (Bezet) 440 ms    Calculated P Axis 49 degrees    Calculated R Axis -10 degrees    Calculated T Axis 49 degrees    Diagnosis       Sinus rhythm with marked sinus arrhythmia  Otherwise normal ECG  No previous ECGs available     CBC WITH AUTOMATED DIFF    Collection Time: 12/28/17 11:58 AM   Result Value Ref Range    WBC 9.7 4.1 - 11.1 K/uL    RBC 5.21 4.10 - 5.70 M/uL    HGB 15.2 12.1 - 17.0 g/dL    HCT 45.8 36.6 - 50.3 %    MCV 87.9 80.0 - 99.0 FL    MCH 29.2 26.0 - 34.0 PG    MCHC 33.2 30.0 - 36.5 g/dL    RDW 13.1 11.5 - 14.5 %    PLATELET 620 149 - 730 K/uL    NEUTROPHILS 64 32 - 75 %    LYMPHOCYTES 23 12 - 49 %    MONOCYTES 9 5 - 13 %    EOSINOPHILS 3 0 - 7 %    BASOPHILS 1 0 - 1 %    ABS. NEUTROPHILS 6.4 1.8 - 8.0 K/UL    ABS. LYMPHOCYTES 2.2 K/UL    ABS. MONOCYTES 0.8 0.0 - 1.0 K/UL    ABS. EOSINOPHILS 0.3 0.0 - 0.4 K/UL    ABS. BASOPHILS 0.1 0.0 - 0.1 K/UL    DF AUTOMATED     POC TROPONIN-I    Collection Time: 12/28/17 12:00 PM   Result Value Ref Range    Troponin-I (POC) <0.04 0.00 - 0.08 ng/mL   POC CHEM8    Collection Time: 12/28/17 12:03 PM   Result Value Ref Range    Calcium, ionized (POC) 1.14 1.12 - 1.32 MMOL/L    Sodium (POC) 137 136 - 145 MMOL/L    Potassium (POC) 4.8 3.5 - 5.1 MMOL/L    Chloride (POC) 103 98 - 107 MMOL/L    CO2 (POC) 28 21 - 32 MMOL/L    Anion gap (POC) 13 5 - 15 mmol/L    Glucose (POC) 98 65 - 100 MG/DL    BUN (POC) 29 (H) 9 - 20 MG/DL    Creatinine (POC) 1.0 0.6 - 1.3 MG/DL    GFRAA, POC >60 >60 ml/min/1.73m2    GFRNA, POC >60 >60 ml/min/1.73m2    Hemoglobin (POC) 15.3 12.1 - 17.0 GM/DL    Hematocrit (POC) 45 36.6 - 50.3 %    Comment Notified RN or MD immediately by          Xr Chest Pa Lat    Result Date: 12/28/2017  Exam:  2 view chest Indication: Left chest pain today Comparison to 2/4/2016. PA and lateral views demonstrate normal heart size. There is no acute process in the lung fields. The osseous structures are unremarkable.      Impression: No acute process or change compared to the prior exam.

## 2017-12-28 NOTE — ED TRIAGE NOTES
Patient ambulatory to ED treatment area with steady gait for complaint of \"I started with chest pain on the left side four hours ago. It woke me up. The pain has remained the same since. \" Denies any nausea or vomiting. Denies taking any medications for the symptoms.

## 2017-12-28 NOTE — LETTER
[facility] EMERGENCY DEPT 
[address] [facility] [address] [address] 
[phone] Work/School Note Date: 12/28/2017 To Whom It May concern: 
 
Alan Roche was seen and treated today in the emergency room by the following provider(s): 
Attending Provider: Gama Floyd MD 
Physician Assistant: MG Browne. Alan Roche may return to work on 12/29/17.  
 
Sincerely, 
 
 
 
 
MG Browne

## 2017-12-28 NOTE — ED NOTES
Second breathing treatment initiated per order. Patient updated on plan of care. Verbalized understanding. Stretcher in lowest position possible, brakes locked, with side rails elevated. Patient on monitor x3. Call bell within reach. Will continue to monitor.

## 2018-07-10 ENCOUNTER — HOSPITAL ENCOUNTER (EMERGENCY)
Age: 33
Discharge: HOME | End: 2018-07-10
Attending: EMERGENCY MEDICINE | Admitting: EMERGENCY MEDICINE
Payer: SELF-PAY

## 2018-07-10 ENCOUNTER — APPOINTMENT (OUTPATIENT)
Dept: GENERAL RADIOLOGY | Age: 33
End: 2018-07-10
Attending: EMERGENCY MEDICINE
Payer: SELF-PAY

## 2018-07-10 ENCOUNTER — APPOINTMENT (OUTPATIENT)
Dept: ULTRASOUND IMAGING | Age: 33
End: 2018-07-10
Attending: EMERGENCY MEDICINE
Payer: SELF-PAY

## 2018-07-10 VITALS
OXYGEN SATURATION: 96 % | DIASTOLIC BLOOD PRESSURE: 85 MMHG | TEMPERATURE: 99.2 F | SYSTOLIC BLOOD PRESSURE: 147 MMHG | RESPIRATION RATE: 16 BRPM | HEART RATE: 98 BPM

## 2018-07-10 DIAGNOSIS — L03.116 CELLULITIS OF LEFT LOWER EXTREMITY: Primary | ICD-10-CM

## 2018-07-10 PROCEDURE — 73590 X-RAY EXAM OF LOWER LEG: CPT

## 2018-07-10 PROCEDURE — 99282 EMERGENCY DEPT VISIT SF MDM: CPT

## 2018-07-10 PROCEDURE — 93971 EXTREMITY STUDY: CPT

## 2018-07-10 RX ORDER — DOXYCYCLINE HYCLATE 100 MG
100 TABLET ORAL 2 TIMES DAILY
Qty: 14 TAB | Refills: 0 | Status: SHIPPED | OUTPATIENT
Start: 2018-07-10 | End: 2018-07-17

## 2018-07-10 NOTE — LETTER
21 [facility] EMERGENCY DEPT 
[address] [facility] [address] [facility] [address] [address] 
[phone] Work/School Note Date: 7/10/2018 To Whom It May concern: 
 
Heidi Dejesus was seen and treated today in the emergency room by the following provider(s): 
Attending Provider: Christo Meza MD. Heidi Dejesus {Return to school/sport/work:53517} Sincerely, Christo Meza MD

## 2018-07-10 NOTE — ED TRIAGE NOTES
Pt reports that he fell at work on June 22 and cut his left knee. States that today he noticed that his left knee is swollen and red.

## 2018-07-10 NOTE — ED NOTES
The patient was discharged home by Dr. Angelica Archuleta in stable condition. The patient is alert and oriented, in no respiratory distress and discharge vital signs obtained. The patient's diagnosis, condition and treatment were explained. The patient expressed understanding. One prescriptions given. No work/school note given. A discharge plan has been developed. A  was not involved in the process. Aftercare instructions were given. Pt ambulatory out of the ED.

## 2018-07-10 NOTE — DISCHARGE INSTRUCTIONS
Cellulitis: Care Instructions  Your Care Instructions    Cellulitis is a skin infection caused by bacteria, most often strep or staph. It often occurs after a break in the skin from a scrape, cut, bite, or puncture, or after a rash. Cellulitis may be treated without doing tests to find out what caused it. But your doctor may do tests, if needed, to look for a specific bacteria, like methicillin-resistant Staphylococcus aureus (MRSA). The doctor has checked you carefully, but problems can develop later. If you notice any problems or new symptoms, get medical treatment right away. Follow-up care is a key part of your treatment and safety. Be sure to make and go to all appointments, and call your doctor if you are having problems. It's also a good idea to know your test results and keep a list of the medicines you take. How can you care for yourself at home? · Take your antibiotics as directed. Do not stop taking them just because you feel better. You need to take the full course of antibiotics. · Prop up the infected area on pillows to reduce pain and swelling. Try to keep the area above the level of your heart as often as you can. · If your doctor told you how to care for your wound, follow your doctor's instructions. If you did not get instructions, follow this general advice:  ¨ Wash the wound with clean water 2 times a day. Don't use hydrogen peroxide or alcohol, which can slow healing. ¨ You may cover the wound with a thin layer of petroleum jelly, such as Vaseline, and a nonstick bandage. ¨ Apply more petroleum jelly and replace the bandage as needed. · Be safe with medicines. Take pain medicines exactly as directed. ¨ If the doctor gave you a prescription medicine for pain, take it as prescribed. ¨ If you are not taking a prescription pain medicine, ask your doctor if you can take an over-the-counter medicine.   To prevent cellulitis in the future  · Try to prevent cuts, scrapes, or other injuries to your skin. Cellulitis most often occurs where there is a break in the skin. · If you get a scrape, cut, mild burn, or bite, wash the wound with clean water as soon as you can to help avoid infection. Don't use hydrogen peroxide or alcohol, which can slow healing. · If you have swelling in your legs (edema), support stockings and good skin care may help prevent leg sores and cellulitis. · Take care of your feet, especially if you have diabetes or other conditions that increase the risk of infection. Wear shoes and socks. Do not go barefoot. If you have athlete's foot or other skin problems on your feet, talk to your doctor about how to treat them. When should you call for help? Call your doctor now or seek immediate medical care if:    · You have signs that your infection is getting worse, such as:  ¨ Increased pain, swelling, warmth, or redness. ¨ Red streaks leading from the area. ¨ Pus draining from the area. ¨ A fever.     · You get a rash.    Watch closely for changes in your health, and be sure to contact your doctor if:    · You do not get better as expected. Where can you learn more? Go to http://eusebio-jess.info/. Anatoliy Amin in the search box to learn more about \"Cellulitis: Care Instructions. \"  Current as of: May 10, 2017  Content Version: 11.7  © 4107-4645 [employer]. Care instructions adapted under license by [employer] (which disclaims liability or warranty for this information). If you have questions about a medical condition or this instruction, always ask your healthcare professional. [address] any warranty or liability for your use of this information. We hope that we have addressed all of your medical concerns. The examination and treatment you received in the Emergency Department were for an emergent problem and were not intended as complete care.  It is important that you follow up with your healthcare provider(s) for ongoing care. If your symptoms worsen or do not improve as expected, and you are unable to reach your usual health care provider(s), you should return to the Emergency Department. Today's healthcare is undergoing tremendous change, and patient satisfaction surveys are one of the many tools to assess the quality of medical care. You may receive a survey from the [employer] organization regarding your experience in the Emergency Department. I hope that your experience has been completely positive, particularly the medical care that I provided. As such, please participate in the survey; anything less than excellent does not meet my expectations or intentions. [facility] and [facility] participate in nationally recognized quality of care measures. If your blood pressure is greater than 120/80, as reported below, we urge that you seek medical care to address the potential of high blood pressure, commonly known as hypertension. Hypertension can be hereditary or can be caused by certain medical conditions, pain, stress, or \"white coat syndrome. \"       Please make an appointment with your health care provider(s) for follow up of your Emergency Department visit. VITALS:   Patient Vitals for the past 8 hrs:   Temp Pulse Resp BP SpO2   07/10/18 1518 99.2 °F (37.3 °C) (!) 127 18 146/87 95 %          Thank you for allowing us to provide you with medical care today. We realize that you have many choices for your emergency care needs. Please choose us in the future for any continued health care needs. Rambo Melgar, [address]: [phone]            No results found for this or any previous visit (from the past 24 hour(s)). Xr Tib/fib Lt    Result Date: 7/10/2018  EXAM:  XR TIB/FIB LT INDICATION: Pain since fall with laceration 2 weeks ago. COMPARISON: None. FINDINGS: AP and lateral views of the left tibia and fibula demonstrate no fracture or other acute osseous or articular  abnormality. The soft tissues are within normal limits. There is no radiopaque foreign body. There are small calcifications in soft tissues. IMPRESSION: Normal left tibia and fibula. Duplex Lower Ext Venous Left    Result Date: 7/10/2018  EXAM:  DUPLEX LOWER EXT VENOUS LEFT INDICATION: Left knee swelling and redness today. Fall, injury, and laceration on 6/22/2018. COMPARISON: None TECHNIQUE: Grayscale, Doppler color flow, and Doppler spectral analysis sonographic imaging of the left lower extremity. FINDINGS: There is no evidence of filling defect within the lower extremity veins, which demonstrate  normal compressibility and flow. IMPRESSION: No deep venous thrombosis.

## 2018-07-10 NOTE — ED PROVIDER NOTES
Patient is a 28 y.o. male presenting with knee pain. Knee Pain    The pain is present in the left lower leg. patient is a 67-year-old white male refill about 3 weeks ago at work and struck the upper part of his tibia with an injury to the same that there was no breaking the skin. He presents with increased swelling of the left lower extremity associated with some erythema as well. He has no fever and chills. He has no other complaints at this time. It was a work related injury. Past Medical History:   Diagnosis Date    Hypertension     Ill-defined condition 03/2017    herniated disk    Ill-defined condition 03/2017    T-4 cracks leaking fluid, but no rupture       History reviewed. No pertinent surgical history. History reviewed. No pertinent family history. Social History     Social History    Marital status:      Spouse name: N/A    Number of children: N/A    Years of education: N/A     Occupational History    Not on file. Social History Main Topics    Smoking status: Current Every Day Smoker     Packs/day: 0.50    Smokeless tobacco: Former User    Alcohol use No      Comment: denies drinking for a year and a half    Drug use: No    Sexual activity: Not on file     Other Topics Concern    Not on file     Social History Narrative         ALLERGIES: Codeine; Phenergan [promethazine]; and Toradol [ketorolac tromethamine]    Review of Systems    Vitals:    07/10/18 1518   BP: 146/87   Pulse: (!) 127   Resp: 18   Temp: 99.2 °F (37.3 °C)   SpO2: 95%            Physical Exam   Constitutional: He is oriented to person, place, and time. He appears well-developed and well-nourished. HENT:   Head: Normocephalic and atraumatic. Mouth/Throat: Oropharynx is clear and moist. No oropharyngeal exudate. Eyes: Conjunctivae are normal. No scleral icterus. Neck: Neck supple. No thyromegaly present. Cardiovascular: Normal rate, regular rhythm and normal heart sounds.   Exam reveals no gallop and no friction rub. No murmur heard. Pulmonary/Chest: Effort normal and breath sounds normal. No stridor. No respiratory distress. He has no wheezes. He has no rales. Abdominal: Soft. Bowel sounds are normal. There is no tenderness. There is no rebound and no guarding. Musculoskeletal: Normal range of motion. Mild swelling lle with erythema ant shin   Lymphadenopathy:     He has no cervical adenopathy. Neurological: He is alert and oriented to person, place, and time. Skin: Skin is warm and dry. Psychiatric: He has a normal mood and affect. Nursing note and vitals reviewed.        [facility]      ED Course       Procedures

## 2018-07-11 ENCOUNTER — HOSPITAL ENCOUNTER (OUTPATIENT)
Age: 33
Setting detail: OBSERVATION
Discharge: HOME | End: 2018-07-12
Attending: EMERGENCY MEDICINE | Admitting: INTERNAL MEDICINE
Payer: OTHER MISCELLANEOUS

## 2018-07-11 DIAGNOSIS — L03.116 CELLULITIS OF LEFT LOWER EXTREMITY: Primary | ICD-10-CM

## 2018-07-11 PROBLEM — E66.9 OBESITY: Status: ACTIVE | Noted: 2018-07-11

## 2018-07-11 PROBLEM — I10 HTN (HYPERTENSION): Status: ACTIVE | Noted: 2018-07-11

## 2018-07-11 PROBLEM — L03.90 CELLULITIS: Status: ACTIVE | Noted: 2018-07-11

## 2018-07-11 LAB
ANION GAP SERPL CALC-SCNC: 9 MMOL/L (ref 5–15)
BASOPHILS # BLD: 0.1 K/UL (ref 0–0.1)
BASOPHILS NFR BLD: 1 % (ref 0–1)
BUN SERPL-MCNC: 13 MG/DL (ref 6–20)
BUN/CREAT SERPL: 13 (ref 12–20)
CALCIUM SERPL-MCNC: 9.1 MG/DL (ref 8.5–10.1)
CHLORIDE SERPL-SCNC: 103 MMOL/L (ref 97–108)
CO2 SERPL-SCNC: 27 MMOL/L (ref 21–32)
CREAT SERPL-MCNC: 1.01 MG/DL (ref 0.7–1.3)
DIFFERENTIAL METHOD BLD: ABNORMAL
EOSINOPHIL # BLD: 0.2 K/UL (ref 0–0.4)
EOSINOPHIL NFR BLD: 2 % (ref 0–7)
ERYTHROCYTE [DISTWIDTH] IN BLOOD BY AUTOMATED COUNT: 13.2 % (ref 11.5–14.5)
GLUCOSE SERPL-MCNC: 88 MG/DL (ref 65–100)
HCT VFR BLD AUTO: 44 % (ref 36.6–50.3)
HGB BLD-MCNC: 14.5 G/DL (ref 12.1–17)
IMM GRANULOCYTES # BLD: 0 K/UL (ref 0–0.04)
IMM GRANULOCYTES NFR BLD AUTO: 0 % (ref 0–0.5)
LACTATE SERPL-SCNC: 0.7 MMOL/L (ref 0.4–2)
LYMPHOCYTES # BLD: 2.6 K/UL (ref 0.8–3.5)
LYMPHOCYTES NFR BLD: 24 % (ref 12–49)
MCH RBC QN AUTO: 29.2 PG (ref 26–34)
MCHC RBC AUTO-ENTMCNC: 33 G/DL (ref 30–36.5)
MCV RBC AUTO: 88.7 FL (ref 80–99)
MONOCYTES # BLD: 0.6 K/UL (ref 0–1)
MONOCYTES NFR BLD: 6 % (ref 5–13)
NEUTS SEG # BLD: 7.3 K/UL (ref 1.8–8)
NEUTS SEG NFR BLD: 68 % (ref 32–75)
NRBC # BLD: 0 K/UL (ref 0–0.01)
NRBC BLD-RTO: 0 PER 100 WBC
PLATELET # BLD AUTO: 269 K/UL (ref 150–400)
PMV BLD AUTO: 8.4 FL (ref 8.9–12.9)
POTASSIUM SERPL-SCNC: 3.9 MMOL/L (ref 3.5–5.1)
RBC # BLD AUTO: 4.96 M/UL (ref 4.1–5.7)
SODIUM SERPL-SCNC: 139 MMOL/L (ref 136–145)
WBC # BLD AUTO: 10.8 K/UL (ref 4.1–11.1)

## 2018-07-11 PROCEDURE — 96365 THER/PROPH/DIAG IV INF INIT: CPT

## 2018-07-11 PROCEDURE — 74011250636 HC RX REV CODE- 250/636: Performed by: INTERNAL MEDICINE

## 2018-07-11 PROCEDURE — 83605 ASSAY OF LACTIC ACID: CPT | Performed by: EMERGENCY MEDICINE

## 2018-07-11 PROCEDURE — 99218 HC RM OBSERVATION: CPT

## 2018-07-11 PROCEDURE — 74011250637 HC RX REV CODE- 250/637: Performed by: INTERNAL MEDICINE

## 2018-07-11 PROCEDURE — 99284 EMERGENCY DEPT VISIT MOD MDM: CPT

## 2018-07-11 PROCEDURE — 87040 BLOOD CULTURE FOR BACTERIA: CPT | Performed by: EMERGENCY MEDICINE

## 2018-07-11 PROCEDURE — 74011250636 HC RX REV CODE- 250/636: Performed by: EMERGENCY MEDICINE

## 2018-07-11 PROCEDURE — 80048 BASIC METABOLIC PNL TOTAL CA: CPT | Performed by: EMERGENCY MEDICINE

## 2018-07-11 PROCEDURE — 36415 COLL VENOUS BLD VENIPUNCTURE: CPT | Performed by: EMERGENCY MEDICINE

## 2018-07-11 PROCEDURE — 96366 THER/PROPH/DIAG IV INF ADDON: CPT

## 2018-07-11 PROCEDURE — 96372 THER/PROPH/DIAG INJ SC/IM: CPT

## 2018-07-11 PROCEDURE — 85025 COMPLETE CBC W/AUTO DIFF WBC: CPT | Performed by: EMERGENCY MEDICINE

## 2018-07-11 RX ORDER — ENOXAPARIN SODIUM 100 MG/ML
40 INJECTION SUBCUTANEOUS EVERY 12 HOURS
Status: DISCONTINUED | OUTPATIENT
Start: 2018-07-11 | End: 2018-07-12 | Stop reason: HOSPADM

## 2018-07-11 RX ORDER — ONDANSETRON 2 MG/ML
4 INJECTION INTRAMUSCULAR; INTRAVENOUS
Status: DISCONTINUED | OUTPATIENT
Start: 2018-07-11 | End: 2018-07-12 | Stop reason: HOSPADM

## 2018-07-11 RX ORDER — SODIUM CHLORIDE 0.9 % (FLUSH) 0.9 %
5-10 SYRINGE (ML) INJECTION AS NEEDED
Status: DISCONTINUED | OUTPATIENT
Start: 2018-07-11 | End: 2018-07-12 | Stop reason: HOSPADM

## 2018-07-11 RX ORDER — ACETAMINOPHEN 325 MG/1
650 TABLET ORAL
Status: DISCONTINUED | OUTPATIENT
Start: 2018-07-11 | End: 2018-07-12 | Stop reason: HOSPADM

## 2018-07-11 RX ORDER — LANOLIN ALCOHOL/MO/W.PET/CERES
3 CREAM (GRAM) TOPICAL ONCE
Status: COMPLETED | OUTPATIENT
Start: 2018-07-11 | End: 2018-07-11

## 2018-07-11 RX ORDER — SODIUM CHLORIDE 0.9 % (FLUSH) 0.9 %
5-10 SYRINGE (ML) INJECTION EVERY 8 HOURS
Status: DISCONTINUED | OUTPATIENT
Start: 2018-07-11 | End: 2018-07-12 | Stop reason: HOSPADM

## 2018-07-11 RX ADMIN — Medication 10 ML: at 22:23

## 2018-07-11 RX ADMIN — MELATONIN TAB 3 MG 3 MG: 3 TAB at 23:25

## 2018-07-11 RX ADMIN — ENOXAPARIN SODIUM 40 MG: 40 INJECTION SUBCUTANEOUS at 22:48

## 2018-07-11 RX ADMIN — VANCOMYCIN HYDROCHLORIDE 2500 MG: 10 INJECTION, POWDER, LYOPHILIZED, FOR SOLUTION INTRAVENOUS at 22:10

## 2018-07-12 ENCOUNTER — APPOINTMENT (OUTPATIENT)
Dept: ULTRASOUND IMAGING | Age: 33
End: 2018-07-12
Attending: INTERNAL MEDICINE
Payer: OTHER MISCELLANEOUS

## 2018-07-12 VITALS
TEMPERATURE: 97.9 F | OXYGEN SATURATION: 95 % | BODY MASS INDEX: 42.95 KG/M2 | HEART RATE: 72 BPM | HEIGHT: 70 IN | RESPIRATION RATE: 14 BRPM | SYSTOLIC BLOOD PRESSURE: 139 MMHG | WEIGHT: 300 LBS | DIASTOLIC BLOOD PRESSURE: 93 MMHG

## 2018-07-12 LAB
ANION GAP SERPL CALC-SCNC: 8 MMOL/L (ref 5–15)
BUN SERPL-MCNC: 15 MG/DL (ref 6–20)
BUN/CREAT SERPL: 13 (ref 12–20)
CALCIUM SERPL-MCNC: 7.9 MG/DL (ref 8.5–10.1)
CHLORIDE SERPL-SCNC: 106 MMOL/L (ref 97–108)
CO2 SERPL-SCNC: 27 MMOL/L (ref 21–32)
CREAT SERPL-MCNC: 1.14 MG/DL (ref 0.7–1.3)
ERYTHROCYTE [DISTWIDTH] IN BLOOD BY AUTOMATED COUNT: 13.4 % (ref 11.5–14.5)
GLUCOSE SERPL-MCNC: 106 MG/DL (ref 65–100)
HCT VFR BLD AUTO: 41.2 % (ref 36.6–50.3)
HGB BLD-MCNC: 13.5 G/DL (ref 12.1–17)
MCH RBC QN AUTO: 29.2 PG (ref 26–34)
MCHC RBC AUTO-ENTMCNC: 32.8 G/DL (ref 30–36.5)
MCV RBC AUTO: 89.2 FL (ref 80–99)
NRBC # BLD: 0 K/UL (ref 0–0.01)
NRBC BLD-RTO: 0 PER 100 WBC
PLATELET # BLD AUTO: 273 K/UL (ref 150–400)
PMV BLD AUTO: 8.6 FL (ref 8.9–12.9)
POTASSIUM SERPL-SCNC: 3.9 MMOL/L (ref 3.5–5.1)
RBC # BLD AUTO: 4.62 M/UL (ref 4.1–5.7)
SODIUM SERPL-SCNC: 141 MMOL/L (ref 136–145)
WBC # BLD AUTO: 9.4 K/UL (ref 4.1–11.1)

## 2018-07-12 PROCEDURE — 96372 THER/PROPH/DIAG INJ SC/IM: CPT

## 2018-07-12 PROCEDURE — 96367 TX/PROPH/DG ADDL SEQ IV INF: CPT

## 2018-07-12 PROCEDURE — 76882 US LMTD JT/FCL EVL NVASC XTR: CPT

## 2018-07-12 PROCEDURE — 99218 HC RM OBSERVATION: CPT

## 2018-07-12 PROCEDURE — 74011250637 HC RX REV CODE- 250/637: Performed by: INTERNAL MEDICINE

## 2018-07-12 PROCEDURE — 74011250636 HC RX REV CODE- 250/636: Performed by: INTERNAL MEDICINE

## 2018-07-12 PROCEDURE — 85027 COMPLETE CBC AUTOMATED: CPT | Performed by: INTERNAL MEDICINE

## 2018-07-12 PROCEDURE — 74011000258 HC RX REV CODE- 258: Performed by: INTERNAL MEDICINE

## 2018-07-12 PROCEDURE — 96368 THER/DIAG CONCURRENT INF: CPT

## 2018-07-12 PROCEDURE — 80048 BASIC METABOLIC PNL TOTAL CA: CPT | Performed by: INTERNAL MEDICINE

## 2018-07-12 PROCEDURE — 36415 COLL VENOUS BLD VENIPUNCTURE: CPT | Performed by: INTERNAL MEDICINE

## 2018-07-12 PROCEDURE — 96366 THER/PROPH/DIAG IV INF ADDON: CPT

## 2018-07-12 RX ORDER — MELOXICAM 15 MG/1
15 TABLET ORAL
Qty: 15 TAB | Refills: 0 | Status: SHIPPED | OUTPATIENT
Start: 2018-07-12 | End: 2021-09-18

## 2018-07-12 RX ORDER — MELOXICAM 7.5 MG/1
15 TABLET ORAL
Status: DISCONTINUED | OUTPATIENT
Start: 2018-07-12 | End: 2018-07-12 | Stop reason: HOSPADM

## 2018-07-12 RX ORDER — SULFAMETHOXAZOLE AND TRIMETHOPRIM 800; 160 MG/1; MG/1
1 TABLET ORAL 2 TIMES DAILY
Qty: 14 TAB | Refills: 0 | Status: SHIPPED | OUTPATIENT
Start: 2018-07-12 | End: 2021-09-18

## 2018-07-12 RX ADMIN — VANCOMYCIN HYDROCHLORIDE 2000 MG: 10 INJECTION, POWDER, LYOPHILIZED, FOR SOLUTION INTRAVENOUS at 05:59

## 2018-07-12 RX ADMIN — Medication 10 ML: at 06:00

## 2018-07-12 RX ADMIN — PIPERACILLIN SODIUM AND TAZOBACTAM SODIUM 3.38 G: 3; .375 INJECTION, POWDER, LYOPHILIZED, FOR SOLUTION INTRAVENOUS at 00:19

## 2018-07-12 RX ADMIN — VANCOMYCIN HYDROCHLORIDE 2000 MG: 10 INJECTION, POWDER, LYOPHILIZED, FOR SOLUTION INTRAVENOUS at 06:15

## 2018-07-12 RX ADMIN — PIPERACILLIN SODIUM AND TAZOBACTAM SODIUM 3.38 G: 3; .375 INJECTION, POWDER, LYOPHILIZED, FOR SOLUTION INTRAVENOUS at 09:07

## 2018-07-12 RX ADMIN — MELOXICAM 15 MG: 7.5 TABLET ORAL at 09:00

## 2018-07-12 RX ADMIN — ENOXAPARIN SODIUM 40 MG: 40 INJECTION SUBCUTANEOUS at 09:04

## 2018-07-12 NOTE — PROGRESS NOTES
7/12/2018  9:22 AM  Case Management Note     Met with patient to discuss discharge planning. Patient is employed but does not have insurance. He had accident at work and now the wound is infected. He stated he notified his boss and has a statement. He may reach out to a  to assist with bills related to this injury  Patient has received card card application, crossover clinic info  Patient see Caryn Arellano at [facility], scheduled tim for July 17 @ 10:15. Patient has scripts that are on $4,00 list. Patient uses [facility] in [facility]. Patient signed OBS letter.   Hugh Francis

## 2018-07-12 NOTE — PROGRESS NOTES
2212 Assumed care of pt. Pt. Resting in stretcher with call bell at bedside. Identified self as primary nurse. Answered questions and discussed plan of care at this time. Will continue to monitor. Initiated abx on patient at this time. 2300 pt. Requesting something to help sleep, spoke with Dr. Moriah Albarado, gave verbal order for melatonin. 0010 Family at bedside. Recliner provided for spouse. Pt. Resting bed at this time. 0100 zosyn complete at this time. Pt. Sleeping with family at bedside. 0200 pt. Sleeping in bed, family at bedside. 0330 AM Labs drawn, VSS. Pt. Requesting temperature of room to be cooler. Family remains at bedside at this time. 0530 pt. Sleeping currently, family remains at bedside, no requests at this time. 0700 Bedside and Verbal shift change report given to Valentina Sauceda (oncoming nurse) by Ita Avery RN (offgoing nurse). Report included the following information SBAR, ED Summary, MAR and Recent Results.

## 2018-07-12 NOTE — PROGRESS NOTES
[facility]: MED RECONCILIATION    Comments/Recommendations:     No health insurance and must pay out-of-pocket  Provided pt with [facility] $4 list  - Baclofen and Indocin on the list  - If needs BP meds then would get prescription for HCTZ, ACE-I, etc since amlodipine, and all calcium channel blockers, are not on the list      Medications removed:  · Mucinex -30mg BID    Medications adjusted:  · Changed all meds to daily prn- lack of insurance so pt does not take meds on daily basis    Information obtained from: Patient      Allergies: Codeine; Phenergan [promethazine]; and Toradol [ketorolac tromethamine]    Prior to Admission Medications:     Medication Documentation Review Audit       Reviewed by Germain Garcia. Marie Vinson (Pharmacist) on 07/11/18 at 2119         Medication Sig Documenting Provider Last Dose Status Taking? albuterol (PROVENTIL HFA, VENTOLIN HFA, PROAIR HFA) 90 mcg/actuation inhaler Take 2 Puffs by inhalation every four (4) hours as needed for Wheezing. MG Fabian  Active Yes    amLODIPine (NORVASC) 5 mg tablet Take 5 mg by mouth daily as needed. Rich Meza MD  Active Yes             Med Note (Melany Rice. Wed Jul 11, 2018  9:18 PM): No health insurance. Unable to afford most prescriptions. Discussed [facility] $4 list  - if BP needs control, consider meds on this list:  Beta-blockers, ACE-I, Loops, Thiazide, imdur are on the list      baclofen (LIORESAL) 10 mg tablet Take 10 mg by mouth as needed. Rich Meza MD  Active Yes             Med Note (Melany Rice. Wed Jul 11, 2018  9:19 PM): No health insurance. Unable to afford most prescriptions. Discussed [facility] $4 list  - baclofen on the list      doxycycline (VIBRA-TABS) 100 mg tablet Take 1 Tab by mouth two (2) times a day for 7 days. Frandy Call MD 7/11/2018 AM Active Yes    gabapentin (NEURONTIN) 300 mg capsule Take 300 mg by mouth daily as needed.  Rich Meza MD  Active Yes    INDOMETHACIN (INDOCIN PO) Take 1 Tab by mouth daily as needed (gout). Phys Other, MD  Active Yes             Med Note (Celestineruth Cuis. Wed Jul 11, 2018  9:19 PM): No health insurance. Unable to afford most prescriptions. Discussed [facility] $4 list  - indocin on the list                      Thank you,  Charla Arzate, Pharm. D.

## 2018-07-12 NOTE — ED PROVIDER NOTES
HPI Comments: 28 y.o. male with past medical history significant for HTN and herniated disk who presents from home with chief complaint of leg swelling. Pt reports he cut his L lower leg on an \"oily\" steel surface 2-3 weeks ago and it seemed to heal fine. Has been having progressively worsening pain since last weeknd. He initially noticed L leg swelling and redness 3 days ago which were \"worst\" yesterday. He was evaluated at [facility] yesterday and was placed on doxycycline (taken 2 doses so far). His leg was marked at [facility] yesterday, but his redness is now extending past those marks. Pt worked today, but [employer] sat with (his) leg propped up\". Pt denies vomiting and fever. There are no other acute medical concerns at this time. Social hx: smokes cigarettes, occasional beer, denies drug use    Note written by Stalin Webb, as dictated by Americo Márquez MD 8:28 PM    The history is provided by the patient. Past Medical History:   Diagnosis Date    Hypertension     Ill-defined condition 03/2017    herniated disk    Ill-defined condition 03/2017    T-4 cracks leaking fluid, but no rupture       History reviewed. No pertinent surgical history. History reviewed. No pertinent family history. Social History     Social History    Marital status:      Spouse name: N/A    Number of children: N/A    Years of education: N/A     Occupational History    Not on file. Social History Main Topics    Smoking status: Current Every Day Smoker     Packs/day: 0.50    Smokeless tobacco: Former User    Alcohol use No      Comment: denies drinking for a year and a half    Drug use: No    Sexual activity: Not on file     Other Topics Concern    Not on file     Social History Narrative         ALLERGIES: Codeine; Phenergan [promethazine]; and Toradol [ketorolac tromethamine]    Review of Systems   Constitutional: Negative for fever. Cardiovascular: Positive for leg swelling. Gastrointestinal: Negative for vomiting. Musculoskeletal: Positive for myalgias. Skin: Positive for color change and wound. All other systems reviewed and are negative. Vitals:    07/11/18 2017   BP: (!) 138/93   Pulse: (!) 103   Resp: 20   Temp: 98.5 °F (36.9 °C)   SpO2: 97%   Weight: 136.1 kg (300 lb)   Height: 5' 10\" (1.778 m)            Physical Exam   Constitutional: He is oriented to person, place, and time. He appears well-developed and well-nourished. No distress. HENT:   Head: Normocephalic and atraumatic. Eyes: Conjunctivae and EOM are normal. Pupils are equal, round, and reactive to light. Neck: Normal range of motion. Neck supple. Cardiovascular: Normal rate, regular rhythm, normal heart sounds and intact distal pulses. Exam reveals no friction rub. No murmur heard. Pulmonary/Chest: Effort normal and breath sounds normal. No respiratory distress. He has no wheezes. He has no rales. He exhibits no tenderness. Abdominal: Soft. Bowel sounds are normal. He exhibits no distension. There is no tenderness. There is no rebound and no guarding. Musculoskeletal: Normal range of motion. He exhibits edema. He exhibits no tenderness. Left leg with swelling and erythema FROM left knee with no effusion; well healed 2 cm linear incision to anterior shin with no erythema or drainage; there is however erythema distal to healed wound on anterior shin which has progressed since the marking yesterday- nvi   Neurological: He is alert and oriented to person, place, and time. He exhibits normal muscle tone. Coordination normal.   Skin: Skin is warm and dry. He is not diaphoretic. No pallor. Psychiatric: He has a normal mood and affect. His behavior is normal.   Nursing note and vitals reviewed. MDM  Number of Diagnoses or Management Options  Cellulitis of left lower extremity:   Diagnosis management comments:  Worsening cellulitis- iv abx start with vanc- given pt well appearing and actual wound site looks clean. Had xray yesterday which was negative no suspicion of abscess- doppler was negative as well. If no improvement would broaden coverage or image but low suspicion for abscess at this time       Amount and/or Complexity of Data Reviewed  Clinical lab tests: ordered and reviewed  Review and summarize past medical records: yes  Discuss the patient with other providers: yes (hospitalist)    Patient Progress  Patient progress: stable        ED Course       Procedures      CONSULT NOTE:  9:31 PM Davian Bartlett MD spoke with Dr. Damien Malcolm, Consult for Hospitalist.  Discussed available diagnostic tests and clinical findings. Dr. Damien Malcolm will admit Pt.

## 2018-07-12 NOTE — ED NOTES
Pt presents with redness, swelling and pain to left leg. Pt has marked area on left leg where provider marked area of redness, presents redness extended past markings.

## 2018-07-12 NOTE — PROGRESS NOTES
[address] [address] Pharmacy Dosing Services: Antimicrobial Stewardship Daily Doc    Consult for antibiotic dosing of Vancomycin by Dr. Grace Flores  Indication:  SSTI  Day of Therapy 1      Vancomycin therapy:  LD: 2500mg x1      Thank you,  Rodolfo Cifuentes Pharm. D.

## 2018-07-12 NOTE — PROGRESS NOTES
[facility] Pharmacy Dosing Services    Enoxaparin was automatically dose-adjusted per [facility] P&T Committee Protocol, with respect to BMI. Assessment/Plan: Consult provided for this   28 y.o. , male. CrCl greater than 100 mL/min. BMI 43. Enoxaparin changed to 40 mg SC every 12 hours per protocol for patient with BMI greater than 40. Pt Weight:   Wt Readings from Last 1 Encounters:   07/11/18 136.1 kg (300 lb)     New Regimen Enoxaparin 40 mg SC every 12 hours   Previous Regimen Enoxaparin 40 mg SC every 24 hours   Serum Creatinine Lab Results   Component Value Date/Time    Creatinine 1.01 07/11/2018 08:51 PM    Creatinine (POC) 1.0 12/28/2017 12:03 PM       Creatinine Clearance Estimated Creatinine Clearance: 143.1 mL/min (based on Cr of 1.01).    BUN Lab Results   Component Value Date/Time    BUN 13 07/11/2018 08:51 PM    BUN (POC) 29 (H) 12/28/2017 12:03 PM         Marquis Kirk, Pharmacist

## 2018-07-12 NOTE — PROGRESS NOTES
[facility] [address] [place] [address]  [phone]      Certificate of Illness      NAME: Inez Agudelo   :  1985        To Whom It May Concern,         Mr. Mitchell Blackburn has been evaluated on 7/10/18, 18 and overnight into 18 for a progressive medical illness requiring overnight observation and intravenous medications. His condition most certainly, and appropriately, would have prevented any level of activity or work during such time. Upon discharge from our facility, the patient may return to work without restriction barring any worsening in his medical condition.                   Thank you,            Mignon Moreau., DO  Hospitalist  [facility]           ___________________________________________________    Attending Physician: Ellen Castillo DO

## 2018-07-12 NOTE — DISCHARGE SUMMARY
Physician Discharge Summary     Patient ID:  Fonda Goldmann  [id number]  16 y.o.  1985    Admit date: 7/11/2018    Discharge date and time: 7/12/2018    Admission Diagnoses: Cellulitis    Discharge Diagnoses:    Principal Diagnosis   Cellulitis                                             Other Diagnoses  Principal Problem:    Cellulitis (7/11/2018)    Active Problems:    HTN (hypertension) (7/11/2018)      Obesity (7/11/2018)         Hospital Course:     Mr. Ashely Landin was admitted on 7/11/18 after coming to ER on 7/10 for apparent left lower extremity cellulitis overlying area just below left knee. Patient had sustained injury at work 2 weeks prior and had a wound overlying tibial tuberosity of said leg. He was originally given 7 day course of doxycycline and told to return to ED if redness advances. Unfortunately, redness worsened and returned to ED. He was started on IV antibiotics (vanc and zosyn) and responded dramatically to these. An US of his left leg revealed a focal area of soft tissue swelling at site of injury c/w cellulitis without any abscess. He will be discharged on 7 day course of Bactrim DS. PCP: Rich Meza MD    Consults: None    Significant Diagnostic Studies: See Hospital Course    Discharged home in improved condition.     Discharge Exam:    Visit Vitals    /80    Pulse 74    Temp 97.7 °F (36.5 °C)    Resp 15    Ht 5' 10\" (1.778 m)    Wt 136.1 kg (300 lb)    SpO2 95%    BMI 43.05 kg/m2     Physical Exam:    Gen: Well-developed, well-nourished, in no acute distress  HEENT:  Pink conjunctivae, hearing intact to voice, moist mucous membranes  Neck: Supple  Resp: No accessory muscle use, clear breath sounds without wheezes rales or rhonchi  Card: No murmurs, normal S1, S2 without thrills or peripheral edema  Abd:  Soft, non-tender, non-distended, normoactive bowel sounds are present  Musc: No cyanosis  Skin: LLE redness dramatically reduced, focal 2 cm x 2 cm area of induration/swelling without fluctuance  Neuro: Face symmetric, tongue midline, speech fluent,  strength is 5/5 bilaterally, hip flexion is 5/5 bilaterally, follows commands appropriately  Psych:  Good insight, oriented to person, place and time, alert      Disposition: home    Patient Instructions:   Current Discharge Medication List      START taking these medications    Details   meloxicam (MOBIC) 15 mg tablet Take 1 Tab by mouth daily as needed for Pain. Take with food  Qty: 15 Tab, Refills: 0      trimethoprim-sulfamethoxazole (BACTRIM DS) 160-800 mg per tablet Take 1 Tab by mouth two (2) times a day. Qty: 14 Tab, Refills: 0         CONTINUE these medications which have NOT CHANGED    Details   amLODIPine (NORVASC) 5 mg tablet Take 5 mg by mouth daily as needed. albuterol (PROVENTIL HFA, VENTOLIN HFA, PROAIR HFA) 90 mcg/actuation inhaler Take 2 Puffs by inhalation every four (4) hours as needed for Wheezing. Qty: 1 Inhaler, Refills: 1      gabapentin (NEURONTIN) 300 mg capsule Take 300 mg by mouth daily as needed. baclofen (LIORESAL) 10 mg tablet Take 10 mg by mouth as needed.          STOP taking these medications       doxycycline (VIBRA-TABS) 100 mg tablet Comments:   Reason for Stopping:         INDOMETHACIN (INDOCIN PO) Comments:   Reason for Stopping:             Activity: Activity as tolerated  Diet: Resume previous diet  Wound Care: Keep wound clean and dry    Follow-up with PCP as needed    Signed:  Nicole Leiva DO  7/12/2018  8:06 AM    Greater than 30 mins was spent in coordination, counseling, and execution of this patient's discharge

## 2018-07-12 NOTE — ED NOTES
Bedside shift change report given to ashwin chanel (oncoming nurse) by Vega tejeda). Report included the following information SBAR, Kardex and ED Summary.

## 2018-07-12 NOTE — H&P
[address]  [phone]    Admission History and Physical      NAME:  Heidi Dejesus   :   1985   MRN:  [id number]     PCP:  Gracy Tian MD     Date/Time:  2018         Subjective:     CHIEF COMPLAINT: leg infection     HISTORY OF PRESENT ILLNESS:     Mr. Ruth Gamez is a 28 y. o. with h/o htn who presents with leg infection. Two weeks ago he was injured at work ( [employer]). He had a laceration just below his knee cap caused by a loose piece of steel. A few days ago he noted redness and swelling of the leg around the site of the injury. He came to the ER yesterday and was treated with doxy for cellulitis. He took two doses of this antibiotic. Past Medical History:   Diagnosis Date    Hypertension     Ill-defined condition 2017    herniated disk    Ill-defined condition 2017    T-4 cracks leaking fluid, but no rupture        History reviewed. No pertinent surgical history. Social History   Substance Use Topics    Smoking status: Current Every Day Smoker     Packs/day: 0.50    Smokeless tobacco: Former User    Alcohol use No      Comment: denies drinking for a year and a half        Family history  htn     Allergies   Allergen Reactions    Codeine Other (comments)     Mother has allergy, so patient has never taken it out of concern.  Phenergan [Promethazine] Hives    Toradol [Ketorolac Tromethamine] Unknown (comments)     hallucinated        Prior to Admission medications    Medication Sig Start Date End Date Taking? Authorizing Provider   doxycycline (VIBRA-TABS) 100 mg tablet Take 1 Tab by mouth two (2) times a day for 7 days. 7/10/18 7/17/18 Yes Christo Meza MD   amLODIPine (NORVASC) 5 mg tablet Take 5 mg by mouth daily as needed. Yes Rich Meza MD   INDOMETHACIN (INDOCIN PO) Take 1 Tab by mouth daily as needed (gout).    Yes Rich Meza MD   albuterol (PROVENTIL HFA, VENTOLIN HFA, PROAIR HFA) 90 mcg/actuation inhaler Take 2 Puffs by inhalation every four (4) hours as needed for Wheezing. 12/28/17  Yes MG Temple   gabapentin (NEURONTIN) 300 mg capsule Take 300 mg by mouth daily as needed. Yes Phys Other, MD   baclofen (LIORESAL) 10 mg tablet Take 10 mg by mouth as needed. Yes Phys Other, MD         Review of Systems:        Gen:  Eyes:  ENT:  CVS:  Pulm:  GI:    :    MS:  Skin:  Psych:  Endo:    Hem:  Renal:    Neuro:            Objective:      VITALS:    Vital signs reviewed; most recent are:    Visit Vitals    BP (!) 138/93 (BP 1 Location: Right arm, BP Patient Position: At rest)    Pulse (!) 103    Temp 98.5 °F (36.9 °C)    Resp 20    Ht 5' 10\" (1.778 m)    Wt 136.1 kg (300 lb)    SpO2 97%    BMI 43.05 kg/m2     SpO2 Readings from Last 6 Encounters:   07/11/18 97%   07/10/18 96%   12/28/17 94%   07/11/17 96%   07/09/17 96%   02/04/16 97%        No intake or output data in the 24 hours ending 07/11/18 0045         Exam:     Physical Exam:    Gen:  Well-developed, well-nourished, in no acute distress  HEENT:  Pink conjunctivae, PERRL, hearing intact to voice, moist mucous membranes  Neck:  Supple, without masses, thyroid non-tender  Resp:  No accessory muscle use, clear breath sounds without wheezes rales or rhonchi  Card:  No murmurs, normal S1, S2 without thrills, bruits or peripheral edema  Abd:  Soft, non-tender, non-distended, normoactive bowel sounds are present, no palpable organomegaly  Lymph:  No cervical adenopathy  Musc:  No cyanosis or clubbing  Skin:  Mild erythema of LLE, with marginal extension outside of the marked zone  Neuro:  Cranial nerves 3-12 are grossly intact,  strength is 5/5 bilaterally, dorsi / plantarflexion strength is 5/5 bilaterally, follows commands appropriately  Psych:  Alert with good insight.   Oriented to person, place, and time       Labs:    Recent Labs      07/11/18 2051   WBC  10.8   HGB  14.5   HCT  44.0   PLT  269     Recent Labs 07/11/18 2051   NA  139   K  3.9   CL  103   CO2  27   GLU  88   BUN  13   CREA  1.01   CA  9.1     No components found for: GLPOC  No results for input(s): PH, PCO2, PO2, HCO3, FIO2 in the last 72 hours. No results for input(s): INR in the last 72 hours. No lab exists for component: INREXT            Assessment/Plan:       Principal Problem:    Cellulitis LLE: took two doses of doxy prior to come back to the ER. Wound culture from 7/9 growing coag negative staph. Start vanc and zosyn. HTN (hypertension): no insurance and does not take medication regularly. Can start HCTZ on discharge       Obesity: advise weight loss     Tobacco abuse: counseled pt on cessation for ~4 minutes.  He has declined a nicotine patch        Total time spent with patient: 48 [address] [facility] discussed with: Patient and Family    Discussed:  Care Plan    Prophylaxis:  Lovenox    Probable Disposition:  Home w/Family           ___________________________________________________    Attending Physician: Ashley Almodovar MD

## 2018-07-12 NOTE — PROGRESS NOTES
[facility] Dosing Services: Antimicrobial Stewardship Progress Note    Consult for antibiotic dosing of Vancomycin by Dr. David Mendoza  Pharmacist reviewed antibiotic appropriateness for 28year old , male  for indication of cellulitis  Day of Therapy: 1    Plan:  Vancomycin therapy:  Start Vancomycin therapy, with loading dose of 2,500 IV administered at 2210 on 7/11/2018. Follow with maintenance dose of 2,000 mg IV at 0600 on 7/12/2018, every 8 hours. Dose calculated to approximate a therapeutic trough of 10-15 mcg/mL. Pharmacist will follow daily and will make changes to dose and/or frequency based on clinical status. Cultures      7/11/2018 Blood - in process   Serum Creatinine     Lab Results   Component Value Date/Time    Creatinine 1.01 07/11/2018 08:51 PM    Creatinine (POC) 1.0 12/28/2017 12:03 PM       Creatinine Clearance Estimated Creatinine Clearance: 143.1 mL/min (based on Cr of 1.01).      Temp   98.1 °F (36.7 °C)    WBC   Lab Results   Component Value Date/Time    WBC 10.8 07/11/2018 08:51 PM       H/H   Lab Results   Component Value Date/Time    HGB 14.5 07/11/2018 08:51 PM        Platelets   Lab Results   Component Value Date/Time    PLATELET 320 47/80/6471 08:51 PM        Jacky Garnett, Pharmacist

## 2018-07-12 NOTE — DISCHARGE INSTRUCTIONS
Patient Discharge Instructions    Sae Paluine / [id number] : 1985    Admitted 2018 Discharged: 2018     Primary Diagnoses  Problem List as of 2018  Date Reviewed: 2018          Codes Class Noted - Resolved    * (Principal)Cellulitis ICD-10-CM: L03.90  ICD-9-CM: 682.9  2018 - Present        HTN (hypertension) ICD-10-CM: I10  ICD-9-CM: 401.9  2018 - Present        Obesity ICD-10-CM: E66.9  ICD-9-CM: 278.00  2018 - Present              Take Home Medications       · It is important that you take the medication exactly as they are prescribed. · Keep your medication in the bottles provided by the pharmacist and keep a list of the medication names, dosages, and times to be taken in your wallet. · Do not take other medications without consulting your doctor. What to do at Home    Recommended diet: Resume previous diet    Recommended activity: Activity as tolerated    If you experience worsening symptoms, please follow up with nearest ER. Follow-up with your PCP as needed        Information obtained by :  I understand that if any problems occur once I am at home I am to contact my physician. I understand and acknowledge receipt of the instructions indicated above. Physician's or R.N.'s Signature                                                                  Date/Time                                                                                                                                              Patient or Representative Signature                                                          Date/Time         Cellulitis: Care Instructions  Your Care Instructions    Cellulitis is a skin infection caused by bacteria, most often strep or staph.  It often occurs after a break in the skin from a scrape, cut, bite, or puncture, or after a rash. Cellulitis may be treated without doing tests to find out what caused it. But your doctor may do tests, if needed, to look for a specific bacteria, like methicillin-resistant Staphylococcus aureus (MRSA). The doctor has checked you carefully, but problems can develop later. If you notice any problems or new symptoms, get medical treatment right away. Follow-up care is a key part of your treatment and safety. Be sure to make and go to all appointments, and call your doctor if you are having problems. It's also a good idea to know your test results and keep a list of the medicines you take. How can you care for yourself at home? · Take your antibiotics as directed. Do not stop taking them just because you feel better. You need to take the full course of antibiotics. · Prop up the infected area on pillows to reduce pain and swelling. Try to keep the area above the level of your heart as often as you can. · If your doctor told you how to care for your wound, follow your doctor's instructions. If you did not get instructions, follow this general advice:  ¨ Wash the wound with clean water 2 times a day. Don't use hydrogen peroxide or alcohol, which can slow healing. ¨ You may cover the wound with a thin layer of petroleum jelly, such as Vaseline, and a nonstick bandage. ¨ Apply more petroleum jelly and replace the bandage as needed. · Be safe with medicines. Take pain medicines exactly as directed. ¨ If the doctor gave you a prescription medicine for pain, take it as prescribed. ¨ If you are not taking a prescription pain medicine, ask your doctor if you can take an over-the-counter medicine. To prevent cellulitis in the future  · Try to prevent cuts, scrapes, or other injuries to your skin. Cellulitis most often occurs where there is a break in the skin. · If you get a scrape, cut, mild burn, or bite, wash the wound with clean water as soon as you can to help avoid infection.  Don't use hydrogen peroxide or alcohol, which can slow healing. · If you have swelling in your legs (edema), support stockings and good skin care may help prevent leg sores and cellulitis. · Take care of your feet, especially if you have diabetes or other conditions that increase the risk of infection. Wear shoes and socks. Do not go barefoot. If you have athlete's foot or other skin problems on your feet, talk to your doctor about how to treat them. When should you call for help? Call your doctor now or seek immediate medical care if:    · You have signs that your infection is getting worse, such as:  ¨ Increased pain, swelling, warmth, or redness. ¨ Red streaks leading from the area. ¨ Pus draining from the area. ¨ A fever.     · You get a rash.    Watch closely for changes in your health, and be sure to contact your doctor if:    · You do not get better as expected. Where can you learn more? Go to http://eusebio-jess.info/. Coty Nuñez in the search box to learn more about \"Cellulitis: Care Instructions. \"  Current as of: May 10, 2017  Content Version: 11.7  © 1859-9174 [employer]. Care instructions adapted under license by [employer] (which disclaims liability or warranty for this information). If you have questions about a medical condition or this instruction, always ask your healthcare professional. [address] any warranty or liability for your use of this information.

## 2018-07-12 NOTE — ED TRIAGE NOTES
Triage: Was seen at [facility] for left leg swelling. Had a fall 3 weeks ago and since then the leg has been swelling. Was told to come to the ER if the swelling gets worse, marked at [facility]. + swelling, unknown if had temperature was not checked. Was diagnosed with Cellulitis and is taking Doxy.

## 2018-07-12 NOTE — ED NOTES
Dr. Michael Gibson at bedside evaluating pt. States pt. Can be dc'd after U/S and case management see him.

## 2018-07-16 LAB
BACTERIA SPEC CULT: NORMAL
SERVICE CMNT-IMP: NORMAL

## 2020-02-04 ENCOUNTER — HOSPITAL ENCOUNTER (OUTPATIENT)
Dept: GENERAL RADIOLOGY | Age: 35
Discharge: HOME | End: 2020-02-04
Payer: COMMERCIAL

## 2020-02-04 DIAGNOSIS — M25.511 RIGHT SHOULDER PAIN: ICD-10-CM

## 2020-02-04 DIAGNOSIS — M54.2 CERVICALGIA: ICD-10-CM

## 2020-02-04 DIAGNOSIS — M47.27 LUMBOSACRAL RADICULOPATHY DUE TO DEGENERATIVE JOINT DISEASE OF SPINE: ICD-10-CM

## 2020-02-04 DIAGNOSIS — M25.512 LEFT SHOULDER PAIN: ICD-10-CM

## 2020-02-04 PROCEDURE — 73030 X-RAY EXAM OF SHOULDER: CPT

## 2020-02-04 PROCEDURE — 72052 X-RAY EXAM NECK SPINE 6/>VWS: CPT

## 2020-02-04 PROCEDURE — 72114 X-RAY EXAM L-S SPINE BENDING: CPT

## 2020-08-03 ENCOUNTER — APPOINTMENT (OUTPATIENT)
Dept: GENERAL RADIOLOGY | Age: 35
End: 2020-08-03
Attending: EMERGENCY MEDICINE
Payer: COMMERCIAL

## 2020-08-03 ENCOUNTER — HOSPITAL ENCOUNTER (EMERGENCY)
Age: 35
Discharge: HOME | End: 2020-08-03
Attending: EMERGENCY MEDICINE
Payer: COMMERCIAL

## 2020-08-03 VITALS
RESPIRATION RATE: 15 BRPM | WEIGHT: 296.52 LBS | TEMPERATURE: 97.3 F | DIASTOLIC BLOOD PRESSURE: 93 MMHG | BODY MASS INDEX: 43.92 KG/M2 | HEART RATE: 91 BPM | HEIGHT: 69 IN | OXYGEN SATURATION: 100 % | SYSTOLIC BLOOD PRESSURE: 149 MMHG

## 2020-08-03 DIAGNOSIS — S60.221A CONTUSION OF RIGHT HAND, INITIAL ENCOUNTER: Primary | ICD-10-CM

## 2020-08-03 PROCEDURE — 73130 X-RAY EXAM OF HAND: CPT

## 2020-08-03 PROCEDURE — 99283 EMERGENCY DEPT VISIT LOW MDM: CPT

## 2020-08-04 NOTE — DISCHARGE INSTRUCTIONS
We hope that we have addressed all of your medical concerns. The examination and treatment you received in the Emergency Department were for an emergent problem and were not intended as complete care. It is important that you follow up with your healthcare provider(s) for ongoing care. If your symptoms worsen or do not improve as expected, and you are unable to reach your usual health care provider(s), you should return to the Emergency Department. Today's healthcare is undergoing tremendous change, and patient satisfaction surveys are one of the many tools to assess the quality of medical care. You may receive a survey from the [employer] regarding your experience in the Emergency Department. I hope that your experience has been completely positive, particularly the medical care that I provided. As such, please participate in the survey; anything less than excellent does not meet my expectations or intentions. [address] [facility] and [address] [place] [address] participate in nationally recognized quality of care measures. If your blood pressure is greater than 120/80, as reported below, we urge that you seek medical care to address the potential of high blood pressure, commonly known as hypertension. Hypertension can be hereditary or can be caused by certain medical conditions, pain, stress, or \"white coat syndrome. \"       Please make an appointment with your health care provider(s) for follow up of your Emergency Department visit. VITALS:   Patient Vitals for the past 8 hrs:   Temp Pulse Resp BP SpO2   08/03/20 2013 97.3 °F (36.3 °C) 91 15 (!) 149/93 100 %          Thank you for allowing us to provide you with medical care today. We realize that you have many choices for your emergency care needs. Please choose us in the future for any continued health care needs. Rodriguezcorinne Johnson [facility], [address] [place] [address] [address] Office: [phone]            No results found for this or any previous visit (from the past 24 hour(s)). Xr Hand Rt Min 3 V    Result Date: 8/3/2020  EXAM: XR HAND RT MIN 3 V INDICATION: trauma. Crush injury, pain COMPARISON: None. FINDINGS: Three views of the right hand demonstrate no fracture or other acute osseous or articular abnormality. There is soft tissue swelling, particularly in the thenar eminence. .     IMPRESSION: Soft tissue swelling. Otherwise negative. .        Patient Education        Hand Bruises: Care Instructions  Your Care Instructions  Bruises, or contusions, can happen as a result of an impact or fall. Most people think of a bruise as a black-and-blue spot. This happens when small blood vessels get torn and leak blood under the skin. The bruise may turn purplish black, reddish blue, or yellowish green as it heals. But bones and muscles can also get bruised. This may damage the hand but not cause a bruise that you can see. Most bruises aren't serious and will go away on their own in 2 to 4 weeks. But sometimes a more serious hand injury might not heal on its own. Tell your doctor if you have new symptoms or your injury is not getting better over time. You may have tests to see if you have bone or nerve damage. These tests may include X-rays, a CT scan, or an MRI. If you damaged bones or muscles, you may need more treatment. The doctor has checked you carefully, but problems can develop later. If you notice any problems or new symptoms, get medical treatment right away. Follow-up care is a key part of your treatment and safety. Be sure to make and go to all appointments, and call your doctor if you are having problems. It's also a good idea to know your test results and keep a list of the medicines you take. How can you care for yourself at home? · Put ice or a cold pack on the hand for 10 to 20 minutes at a time. Put a thin cloth between the ice and your skin.   · Prop up your hand on a pillow when you ice it or anytime you sit or lie down during the next 3 days. Try to keep your hand above the level of your heart. This will help reduce swelling. · Be safe with medicines. Read and follow all instructions on the label. ? If the doctor gave you a prescription medicine for pain, take it as prescribed. ? If you are not taking a prescription pain medicine, ask your doctor if you can take an over-the-counter medicine. · Be sure to follow your doctor's advice about moving and exercising your injured hand. When should you call for help? Call your doctor now or seek immediate medical care if:  · Your pain gets worse. · You have new or worse swelling. · You have tingling, weakness, or numbness in the area near the bruise. · The area near the bruise is cold or pale. · You have symptoms of infection, such as:  ? Increased pain, swelling, warmth, or redness. ? Red streaks leading from the area. ? Pus draining from the area. ? A fever. Watch closely for changes in your health, and be sure to contact your doctor if:  · You do not get better as expected. Where can you learn more? Go to http://www.gray.com/  Enter U845 in the search box to learn more about \"Hand Bruises: Care Instructions. \"  Current as of: June 26, 2019               Content Version: 12.5  © 5399-9326 [employer]. Care instructions adapted under license by [employer] (which disclaims liability or warranty for this information). If you have questions about a medical condition or this instruction, always ask your healthcare professional. [place] [address] any warranty or liability for your use of this information.

## 2020-08-04 NOTE — ED TRIAGE NOTES
Pt ambulates to treatment area he states that this morning between 0930- noon he crushed his right hand between a car frame and some wrenches.   He has applied cold rags to the hand all day

## 2020-08-04 NOTE — ED PROVIDER NOTES
This is a 58-year-old male comes emergency room with chief plan right hand pain. Patient is right-hand dominant. Patient states that approximately 10:00 this morning he got his hand caught between a car frame and retches while at work. Patient states he continue working throughout the day. Patient states that he has applied cold rags to his hand. Patient states that he is on pain management and does not wish to have any other pain medicine currently. Patient states that he just wants to make sure that he has no fracture. The history is provided by the patient. No  was used. Hand Pain    The current episode started 6 to 12 hours ago. The problem occurs constantly. The problem has been gradually worsening. The pain is present in the right hand. The quality of the pain is described as aching. The pain is mild. Associated symptoms include limited range of motion and stiffness. Pertinent negatives include no numbness, no tingling, no itching, no back pain and no neck pain. The symptoms are aggravated by palpation and activity. He has tried nothing for the symptoms. There has been a history of trauma. Past Medical History:   Diagnosis Date    Hypertension     Ill-defined condition 03/2017    herniated disk    Ill-defined condition 03/2017    T-4 cracks leaking fluid, but no rupture       History reviewed. No pertinent surgical history. History reviewed. No pertinent family history.     Social History     Socioeconomic History    Marital status:      Spouse name: Not on file    Number of children: Not on file    Years of education: Not on file    Highest education level: Not on file   Occupational History    Not on file   Social Needs    Financial resource strain: Not on file    Food insecurity     Worry: Not on file     Inability: Not on file    Transportation needs     Medical: Not on file     Non-medical: Not on file   Tobacco Use    Smoking status: Current Every Day Smoker     Packs/day: 1.50    Smokeless tobacco: Former User   Substance and Sexual Activity    Alcohol use: Yes     Comment: occassionally    Drug use: No    Sexual activity: Not on file   Lifestyle    Physical activity     Days per week: Not on file     Minutes per session: Not on file    Stress: Not on file   Relationships    Social connections     Talks on phone: Not on file     Gets together: Not on file     Attends Faith service: Not on file     Active member of club or organization: Not on file     Attends meetings of clubs or organizations: Not on file     Relationship status: Not on file    Intimate partner violence     Fear of current or ex partner: Not on file     Emotionally abused: Not on file     Physically abused: Not on file     Forced sexual activity: Not on file   Other Topics Concern    Not on file   Social History Narrative    Not on file     ALLERGIES: Codeine; Phenergan [promethazine]; and Toradol [ketorolac tromethamine]    Review of Systems   Constitutional: Negative for appetite change, chills, fever and unexpected weight change. HENT: Negative for ear pain, hearing loss, rhinorrhea and trouble swallowing. Eyes: Negative for pain and visual disturbance. Respiratory: Negative for cough, chest tightness and shortness of breath. Cardiovascular: Negative for chest pain and palpitations. Gastrointestinal: Negative for abdominal distention, abdominal pain, blood in stool and vomiting. Genitourinary: Negative for dysuria, hematuria and urgency. Musculoskeletal: Positive for arthralgias, myalgias and stiffness. Negative for back pain and neck pain. Skin: Negative for itching and rash. Neurological: Negative for dizziness, tingling, syncope, weakness and numbness. Psychiatric/Behavioral: Negative for confusion and suicidal ideas. All other systems reviewed and are negative.       Vitals:    08/03/20 2013   BP: (!) 149/93   Pulse: 91   Resp: 15   Temp: 97.3 °F (36.3 °C)   SpO2: 100%   Weight: 134.5 kg (296 lb 8.3 oz)   Height: 5' 9\" (1.753 m)            Physical Exam  Vitals signs and nursing note reviewed. Constitutional:       General: He is not in acute distress. Appearance: He is well-developed. He is not diaphoretic. HENT:      Head: Normocephalic and atraumatic. Right Ear: External ear normal.      Left Ear: External ear normal.      Nose: Nose normal.      Mouth/Throat:      Pharynx: No oropharyngeal exudate. Eyes:      General: No scleral icterus. Right eye: No discharge. Left eye: No discharge. Conjunctiva/sclera: Conjunctivae normal.      Pupils: Pupils are equal, round, and reactive to light. Neck:      Musculoskeletal: Normal range of motion and neck supple. Vascular: No JVD. Trachea: No tracheal deviation. Cardiovascular:      Rate and Rhythm: Normal rate and regular rhythm. Heart sounds: Normal heart sounds. No murmur. No friction rub. No gallop. Pulmonary:      Effort: Pulmonary effort is normal. No respiratory distress. Breath sounds: Normal breath sounds. No stridor. No decreased breath sounds, wheezing, rhonchi or rales. Chest:      Chest wall: No tenderness. Musculoskeletal:         General: Tenderness present. Right hand: He exhibits tenderness and swelling (Minimal). Normal sensation noted. Normal strength noted. Skin:     General: Skin is warm and dry. Capillary Refill: Capillary refill takes less than 2 seconds. Coloration: Skin is not pale. Findings: No erythema or rash. Neurological:      General: No focal deficit present. Mental Status: He is alert and oriented to person, place, and time. GCS: GCS eye subscore is 4. GCS verbal subscore is 5. GCS motor subscore is 6. Cranial Nerves: No cranial nerve deficit. Sensory: No sensory deficit. Motor: No weakness or abnormal muscle tone.       Coordination: Coordination normal. Deep Tendon Reflexes: Reflexes are normal and symmetric. Reflexes normal.   Psychiatric:         Mood and Affect: Mood normal.         Behavior: Behavior normal.         Thought Content: Thought content normal.         Judgment: Judgment normal.          MDM  Number of Diagnoses or Management Options  Contusion of right hand, initial encounter:      Amount and/or Complexity of Data Reviewed  Tests in the radiology section of CPT®: ordered and reviewed    Risk of Complications, Morbidity, and/or Mortality  Presenting problems: moderate  Diagnostic procedures: low  Management options: moderate    Patient Progress  Patient progress: stable       Procedures    Chief Complaint   Patient presents with    Hand Pain       The patient's presenting problems have been discussed, and they are in agreement with the care plan formulated and outlined with them. I have encouraged them to ask questions as they arise throughout their visit. MEDICATIONS GIVEN:  Medications - No data to display    LABS REVIEWED:  No results found for this or any previous visit (from the past 24 hour(s)). VITAL SIGNS:  Patient Vitals for the past 24 hrs:   Temp Pulse Resp BP SpO2   08/03/20 2013 97.3 °F (36.3 °C) 91 15 (!) 149/93 100 %       RADIOLOGY RESULTS:  The following have been ordered and reviewed:  Xr Hand Rt Min 3 V    Result Date: 8/3/2020  EXAM: XR HAND RT MIN 3 V INDICATION: trauma. Crush injury, pain COMPARISON: None. FINDINGS: Three views of the right hand demonstrate no fracture or other acute osseous or articular abnormality. There is soft tissue swelling, particularly in the thenar eminence. .     IMPRESSION: Soft tissue swelling. Otherwise negative. Leela Kilgore PROGRESS NOTES:  Discussed results and plan with patient. Patient will be discharged home with Ortho hand follow up. Patient instructed to return to the emergency room for any worsening symptoms or any other concerns. DIAGNOSIS:    1.  Contusion of right hand, initial encounter        PLAN:  Follow-up Information     Follow up With Specialties Details Why Contact Info    Yobany Zambrano MD Orthopedic Surgery  As needed [address] [facility]  [address] [facility]  [phone]      [address] [place] [address] DEPT Emergency Medicine  If symptoms worsen [address] [id number] [phone]        Discharge Medication List as of 8/3/2020  9:32 PM      CONTINUE these medications which have NOT CHANGED    Details   gabapentin (NEURONTIN) 300 mg capsule Take 300 mg by mouth daily as needed., Historical Med      baclofen (LIORESAL) 10 mg tablet Take 10 mg by mouth as needed., Historical Med      meloxicam (MOBIC) 15 mg tablet Take 1 Tab by mouth daily as needed for Pain. Take with food, Print, Disp-15 Tab, R-0      trimethoprim-sulfamethoxazole (BACTRIM DS) 160-800 mg per tablet Take 1 Tab by mouth two (2) times a day., Print, Disp-14 Tab, R-0      amLODIPine (NORVASC) 5 mg tablet Take 5 mg by mouth daily as needed., Historical Med      albuterol (PROVENTIL HFA, VENTOLIN HFA, PROAIR HFA) 90 mcg/actuation inhaler Take 2 Puffs by inhalation every four (4) hours as needed for Wheezing., Print, Disp-1 Inhaler, R-1             ED COURSE: The patient's hospital course has been uncomplicated.

## 2020-08-04 NOTE — ED NOTES
The pt is discharged home with follow-up instructions. The pt verbalizes understanding of the discharge instructions.

## 2021-08-13 ENCOUNTER — HOSPITAL ENCOUNTER (EMERGENCY)
Age: 36
Discharge: HOME | End: 2021-08-13
Attending: EMERGENCY MEDICINE

## 2021-08-13 VITALS
TEMPERATURE: 98.2 F | DIASTOLIC BLOOD PRESSURE: 79 MMHG | SYSTOLIC BLOOD PRESSURE: 151 MMHG | HEART RATE: 83 BPM | RESPIRATION RATE: 16 BRPM | OXYGEN SATURATION: 100 %

## 2021-08-13 DIAGNOSIS — S05.02XD INJURY OF CONJUNCTIVA AND CORNEAL ABRASION WITHOUT FOREIGN BODY, LEFT EYE, SUBSEQUENT ENCOUNTER: Primary | ICD-10-CM

## 2021-08-13 PROCEDURE — 99284 EMERGENCY DEPT VISIT MOD MDM: CPT

## 2021-08-13 PROCEDURE — 74011000250 HC RX REV CODE- 250: Performed by: EMERGENCY MEDICINE

## 2021-08-13 RX ORDER — ERYTHROMYCIN 5 MG/G
OINTMENT OPHTHALMIC
Qty: 1 TUBE | Refills: 0 | Status: SHIPPED | OUTPATIENT
Start: 2021-08-13 | End: 2021-08-20

## 2021-08-13 RX ORDER — TETRACAINE HYDROCHLORIDE 5 MG/ML
1 SOLUTION OPHTHALMIC
Status: COMPLETED | OUTPATIENT
Start: 2021-08-13 | End: 2021-08-13

## 2021-08-13 RX ADMIN — TETRACAINE HYDROCHLORIDE 1 DROP: 5 SOLUTION OPHTHALMIC at 20:13

## 2021-08-13 RX ADMIN — FLUORESCEIN SODIUM 1 STRIP: 1 STRIP OPHTHALMIC at 20:13

## 2021-08-13 NOTE — ED PROVIDER NOTES
HPI   38 yo M presents with severe pain to left eye and blurry vision onset upon awakening this morning. Denies injury or trauma. No relieving or modifying symptoms. Wears glasses, no contacts. No headache. Took excedrin without relief. Tetanus is UTD. Past Medical History:   Diagnosis Date    Hypertension     Ill-defined condition 03/2017    herniated disk    Ill-defined condition 03/2017    T-4 cracks leaking fluid, but no rupture       No past surgical history on file. No family history on file. Social History     Socioeconomic History    Marital status:      Spouse name: Not on file    Number of children: Not on file    Years of education: Not on file    Highest education level: Not on file   Occupational History    Not on file   Tobacco Use    Smoking status: Current Every Day Smoker     Packs/day: 1.50    Smokeless tobacco: Former User   Substance and Sexual Activity    Alcohol use: Yes     Comment: occassionally    Drug use: No    Sexual activity: Not on file   Other Topics Concern    Not on file   Social History Narrative    Not on file     Social Determinants of Health     Financial Resource Strain:     Difficulty of Paying Living Expenses:    Food Insecurity:     Worried About [address] [employer] in the Last Year:     [address] [employer] in the Last Year:    Transportation Needs:     Lack of Transportation (Medical):      Lack of Transportation (Non-Medical):    Physical Activity:     Days of Exercise per Week:     Minutes of Exercise per Session:    Stress:     Feeling of Stress :    Social Connections:     Frequency of Communication with Friends and Family:     Frequency of Social Gatherings with Friends and Family:     Attends Restorationism Services:     Active Member of Clubs or Organizations:     Attends Club or Organization Meetings:     Marital Status:    Intimate Partner Violence:     Fear of Current or Ex-Partner:     Emotionally Abused:     Physically Abused:     Sexually Abused: ALLERGIES: Codeine, Phenergan [promethazine], and Toradol [ketorolac tromethamine]    Review of Systems   Constitutional: Negative for chills and fever. HENT: Negative for congestion, rhinorrhea and sore throat. Eyes: Positive for photophobia, pain, redness and visual disturbance. Respiratory: Negative for cough and shortness of breath. Gastrointestinal: Negative for nausea and vomiting. Musculoskeletal: Negative for neck pain and neck stiffness. Neurological: Negative for weakness, numbness and headaches. All other systems reviewed and are negative. There were no vitals filed for this visit. Physical Exam   Physical Examination: General appearance - alert, well appearing, and in no distress, oriented to person, place, and time and normal appearing weight  Eyes - pupils equal and reactive, extraocular eye movements intact, corneal abrasion to 5 o'clock left cornea and abrasion to left lateral sclera, pain completely resolved with tetracaine, visual fields intact to confrontation, IOP 17 left eye  Neck - supple, no significant adenopathy  Neurological - alert, oriented, normal speech, no focal findings or movement disorder noted  Musculoskeletal - no joint tenderness, deformity or swelling  Extremities - peripheral pulses normal, no pedal edema, no clubbing or cyanosis  Skin - normal coloration and turgor, no rashes, no suspicious skin lesions noted  MDM  Number of Diagnoses or Management Options     Amount and/or Complexity of Data Reviewed  Obtain history from someone other than the patient: yes (family)    Patient Progress  Patient progress: improved         Procedures  Pain improved after tetracaine given in ED. Will discharge with erythromycin. Follow-up with ophthalmology return to ED for worsening symptoms.

## 2021-08-14 NOTE — DISCHARGE INSTRUCTIONS
We hope that we have addressed all of your medical concerns. The examination and treatment you received in the Emergency Department were for an emergent problem and were not intended as complete care. It is important that you follow up with your healthcare provider(s) for ongoing care. If your symptoms worsen or do not improve as expected, and you are unable to reach your usual health care provider(s), you should return to the Emergency Department. Today's healthcare is undergoing tremendous change, and patient satisfaction surveys are one of the many tools to assess the quality of medical care. You may receive a survey from the [employer] regarding your experience in the Emergency Department. I hope that your experience has been completely positive, particularly the medical care that I provided. As such, please participate in the survey; anything less than excellent does not meet my expectations or intentions. [facility] and [address] [place] [address] participate in nationally recognized quality of care measures. If your blood pressure is greater than 120/80, as reported below, we urge that you seek medical care to address the potential of high blood pressure, commonly known as hypertension. Hypertension can be hereditary or can be caused by certain medical conditions, pain, stress, or \"white coat syndrome. \"       Please make an appointment with your health care provider(s) for follow up of your Emergency Department visit. VITALS:   Patient Vitals for the past 8 hrs:   Temp Pulse Resp BP SpO2   08/13/21 1945 98.2 °F (36.8 °C) (!) 101 18 (!) 169/111 98 %          Thank you for allowing us to provide you with medical care today. We realize that you have many choices for your emergency care needs. Please choose us in the future for any continued health care needs. Romero Bhardwaj Staff, [address] [place] [address] [address] Office: [phone]            No results found for this or any previous visit (from the past 24 hour(s)). No results found.

## 2021-08-14 NOTE — ED NOTES
Patient given discharge papers and instructions by primary RN. Patient verbalized understanding and stated not having questions or concerns regarding his care. Patient ambulatory out of ED.

## 2021-09-18 ENCOUNTER — HOSPITAL ENCOUNTER (EMERGENCY)
Age: 36
Discharge: HOME | End: 2021-09-18
Attending: EMERGENCY MEDICINE

## 2021-09-18 ENCOUNTER — APPOINTMENT (OUTPATIENT)
Dept: CT IMAGING | Age: 36
End: 2021-09-18
Attending: EMERGENCY MEDICINE

## 2021-09-18 ENCOUNTER — APPOINTMENT (OUTPATIENT)
Dept: GENERAL RADIOLOGY | Age: 36
End: 2021-09-18
Attending: EMERGENCY MEDICINE

## 2021-09-18 VITALS
HEART RATE: 90 BPM | TEMPERATURE: 98.7 F | HEIGHT: 70 IN | SYSTOLIC BLOOD PRESSURE: 142 MMHG | WEIGHT: 315 LBS | RESPIRATION RATE: 19 BRPM | OXYGEN SATURATION: 98 % | DIASTOLIC BLOOD PRESSURE: 82 MMHG | BODY MASS INDEX: 45.1 KG/M2

## 2021-09-18 DIAGNOSIS — R07.89 ATYPICAL CHEST PAIN: Primary | ICD-10-CM

## 2021-09-18 DIAGNOSIS — J18.9 PNEUMONIA OF LEFT LOWER LOBE DUE TO INFECTIOUS ORGANISM: ICD-10-CM

## 2021-09-18 DIAGNOSIS — I10 HYPERTENSION, UNSPECIFIED TYPE: ICD-10-CM

## 2021-09-18 LAB
ALBUMIN SERPL-MCNC: 3.4 G/DL (ref 3.5–5)
ALBUMIN/GLOB SERPL: 0.8 {RATIO} (ref 1.1–2.2)
ALP SERPL-CCNC: 78 U/L (ref 45–117)
ALT SERPL-CCNC: 26 U/L (ref 12–78)
ANION GAP SERPL CALC-SCNC: 6 MMOL/L (ref 5–15)
AST SERPL-CCNC: 19 U/L (ref 15–37)
BASOPHILS # BLD: 0.1 K/UL (ref 0–0.1)
BASOPHILS NFR BLD: 1 % (ref 0–1)
BILIRUB SERPL-MCNC: 0.2 MG/DL (ref 0.2–1)
BUN SERPL-MCNC: 16 MG/DL (ref 6–20)
BUN/CREAT SERPL: 14 (ref 12–20)
CALCIUM SERPL-MCNC: 8.8 MG/DL (ref 8.5–10.1)
CHLORIDE SERPL-SCNC: 104 MMOL/L (ref 97–108)
CO2 SERPL-SCNC: 30 MMOL/L (ref 21–32)
CREAT SERPL-MCNC: 1.12 MG/DL (ref 0.7–1.3)
DIFFERENTIAL METHOD BLD: ABNORMAL
EOSINOPHIL # BLD: 0.7 K/UL (ref 0–0.4)
EOSINOPHIL NFR BLD: 6 % (ref 0–7)
ERYTHROCYTE [DISTWIDTH] IN BLOOD BY AUTOMATED COUNT: 12.4 % (ref 11.5–14.5)
GLOBULIN SER CALC-MCNC: 4.1 G/DL (ref 2–4)
GLUCOSE BLD STRIP.AUTO-MCNC: 94 MG/DL (ref 65–117)
GLUCOSE SERPL-MCNC: 101 MG/DL (ref 65–100)
HCT VFR BLD AUTO: 42.9 % (ref 36.6–50.3)
HGB BLD-MCNC: 14 G/DL (ref 12.1–17)
IMM GRANULOCYTES # BLD AUTO: 0.1 K/UL (ref 0–0.04)
IMM GRANULOCYTES NFR BLD AUTO: 1 % (ref 0–0.5)
LYMPHOCYTES # BLD: 2.6 K/UL (ref 0.8–3.5)
LYMPHOCYTES NFR BLD: 24 % (ref 12–49)
MCH RBC QN AUTO: 29 PG (ref 26–34)
MCHC RBC AUTO-ENTMCNC: 32.6 G/DL (ref 30–36.5)
MCV RBC AUTO: 89 FL (ref 80–99)
MONOCYTES # BLD: 0.7 K/UL (ref 0–1)
MONOCYTES NFR BLD: 7 % (ref 5–13)
NEUTS SEG # BLD: 6.5 K/UL (ref 1.8–8)
NEUTS SEG NFR BLD: 62 % (ref 32–75)
NRBC # BLD: 0 K/UL (ref 0–0.01)
NRBC BLD-RTO: 0 PER 100 WBC
PLATELET # BLD AUTO: 297 K/UL (ref 150–400)
PMV BLD AUTO: 8.8 FL (ref 8.9–12.9)
POTASSIUM SERPL-SCNC: 4.6 MMOL/L (ref 3.5–5.1)
PROT SERPL-MCNC: 7.5 G/DL (ref 6.4–8.2)
RBC # BLD AUTO: 4.82 M/UL (ref 4.1–5.7)
SERVICE CMNT-IMP: NORMAL
SODIUM SERPL-SCNC: 140 MMOL/L (ref 136–145)
TROPONIN I SERPL-MCNC: <0.05 NG/ML
WBC # BLD AUTO: 10.6 K/UL (ref 4.1–11.1)

## 2021-09-18 PROCEDURE — 84484 ASSAY OF TROPONIN QUANT: CPT

## 2021-09-18 PROCEDURE — 70450 CT HEAD/BRAIN W/O DYE: CPT

## 2021-09-18 PROCEDURE — 85025 COMPLETE CBC W/AUTO DIFF WBC: CPT

## 2021-09-18 PROCEDURE — 71045 X-RAY EXAM CHEST 1 VIEW: CPT

## 2021-09-18 PROCEDURE — 36415 COLL VENOUS BLD VENIPUNCTURE: CPT

## 2021-09-18 PROCEDURE — 74011250637 HC RX REV CODE- 250/637: Performed by: EMERGENCY MEDICINE

## 2021-09-18 PROCEDURE — 99284 EMERGENCY DEPT VISIT MOD MDM: CPT

## 2021-09-18 PROCEDURE — 93005 ELECTROCARDIOGRAM TRACING: CPT

## 2021-09-18 PROCEDURE — 82962 GLUCOSE BLOOD TEST: CPT

## 2021-09-18 PROCEDURE — 80053 COMPREHEN METABOLIC PANEL: CPT

## 2021-09-18 RX ORDER — DOXYCYCLINE HYCLATE 100 MG
100 TABLET ORAL
Status: COMPLETED | OUTPATIENT
Start: 2021-09-18 | End: 2021-09-18

## 2021-09-18 RX ORDER — DOXYCYCLINE HYCLATE 100 MG
100 TABLET ORAL 2 TIMES DAILY
Qty: 14 TABLET | Refills: 0 | Status: SHIPPED | OUTPATIENT
Start: 2021-09-18 | End: 2021-09-25

## 2021-09-18 RX ADMIN — DOXYCYCLINE HYCLATE 100 MG: 100 TABLET, COATED ORAL at 21:50

## 2021-09-18 NOTE — ED TRIAGE NOTES
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 \"  States that within the last week he has had slurred speech and L side weakness and disorientation at times  con't to work today and decided he would come to ED after work today      BS 94 in triage

## 2021-09-19 LAB
ATRIAL RATE: 94 BPM
CALCULATED P AXIS, ECG09: 44 DEGREES
CALCULATED R AXIS, ECG10: -13 DEGREES
CALCULATED T AXIS, ECG11: 12 DEGREES
DIAGNOSIS, 93000: NORMAL
P-R INTERVAL, ECG05: 152 MS
Q-T INTERVAL, ECG07: 348 MS
QRS DURATION, ECG06: 98 MS
QTC CALCULATION (BEZET), ECG08: 435 MS
VENTRICULAR RATE, ECG03: 94 BPM

## 2021-09-19 NOTE — ED NOTES
The patient was discharged home by Dr Alexander Jacobo in stable condition. The patient is alert and oriented, in no respiratory distress and discharge vital signs obtained. The patient's diagnosis, condition and treatment were explained. The patient expressed understanding. A discharge plan has been developed. A  was not involved in the process. Aftercare instructions were given. Pt ambulatory out of the ED.

## 2021-09-19 NOTE — ED PROVIDER NOTES
39-year-old male with history of hypertension presents to the emergency department with chief complaint of chest pain. This is been intermittent for the past several weeks and sometimes worse when he walks. He tells me he stopped taking his blood pressure medications because he felt like he no longer needed it. He no longer follows with primary care. He was seen about 1 month ago in the emergency department and diagnosed with a left corneal abrasion and treated for that. He is concerned that he could have had a stroke or heart attack as he has a family history of early cardiovascular disease. The history is provided by the patient and medical records. Chest Pain (Angina)   This is a new problem. The current episode started more than 1 week ago. The pain is associated with exertion. The pain is present in the left side. The pain is moderate. The quality of the pain is described as sharp and dull. The pain does not radiate. Associated symptoms include weakness. Pertinent negatives include no abdominal pain, no cough, no diaphoresis, no fever, no headaches, no nausea, no near-syncope, no numbness, no shortness of breath and no vomiting. Risk factors include obesity, male gender and hypertension. Past Medical History:   Diagnosis Date    Hypertension     Ill-defined condition 2017    herniated disk    Ill-defined condition 2017    T-4 cracks leaking fluid, but no rupture       History reviewed. No pertinent surgical history. History reviewed. No pertinent family history.     Social History     Socioeconomic History    Marital status:      Spouse name: Not on file    Number of children: Not on file    Years of education: Not on file    Highest education level: Not on file   Occupational History    Not on file   Tobacco Use    Smoking status: Former Smoker     Packs/day: 1.50     Quit date: 2021     Years since quittin.0    Smokeless tobacco: Former User   Vaping Use  Vaping Use: Every day    Substances: Nicotine   Substance and Sexual Activity    Alcohol use: Yes     Comment: occassionally    Drug use: No    Sexual activity: Not on file   Other Topics Concern    Not on file   Social History Narrative    Not on file     Social Determinants of Health     Financial Resource Strain:     Difficulty of Paying Living Expenses:    Food Insecurity:     Worried About Running Out of Food in the Last Year:     [address] in the Last Year:    Transportation Needs:     Lack of Transportation (Medical):  Lack of Transportation (Non-Medical):    Physical Activity:     Days of Exercise per Week:     Minutes of Exercise per Session:    Stress:     Feeling of Stress :    Social Connections:     Frequency of Communication with Friends and Family:     Frequency of Social Gatherings with Friends and Family:     Attends Hindu Services:     Active Member of Clubs or Organizations:     Attends Club or Organization Meetings:     Marital Status:    Intimate Partner Violence:     Fear of Current or Ex-Partner:     Emotionally Abused:     Physically Abused:     Sexually Abused: ALLERGIES: Codeine, Phenergan [promethazine], and Toradol [ketorolac tromethamine]    Review of Systems   Constitutional: Negative for diaphoresis, fatigue and fever. HENT: Negative for sneezing and sore throat. Respiratory: Negative for cough and shortness of breath. Cardiovascular: Positive for chest pain. Negative for leg swelling and near-syncope. Gastrointestinal: Negative for abdominal pain, diarrhea, nausea and vomiting. Genitourinary: Negative for difficulty urinating and dysuria. Musculoskeletal: Negative for arthralgias and myalgias. Skin: Negative for color change and rash. Neurological: Positive for weakness. Negative for numbness and headaches. Psychiatric/Behavioral: Negative for agitation and behavioral problems.        Vitals:    09/18/21 2003   BP: (!) 155/94   Pulse: 93   Resp: 20   Temp: 98.7 °F (37.1 °C)   Weight: 149.7 kg (330 lb 0.5 oz)   Height: 5' 10\" (1.778 m)            Physical Exam  Vitals and nursing note reviewed. Constitutional:       General: He is not in acute distress. Appearance: Normal appearance. He is well-developed. He is obese. He is not ill-appearing, toxic-appearing or diaphoretic. HENT:      Head: Normocephalic and atraumatic. Nose: Nose normal.      Mouth/Throat:      Mouth: Mucous membranes are moist.      Pharynx: Oropharynx is clear. Eyes:      Extraocular Movements: Extraocular movements intact. Conjunctiva/sclera: Conjunctivae normal.      Pupils: Pupils are equal, round, and reactive to light. Cardiovascular:      Rate and Rhythm: Normal rate and regular rhythm. Pulses: Normal pulses. Heart sounds: No murmur heard. Pulmonary:      Effort: Pulmonary effort is normal. No respiratory distress. Breath sounds: Normal breath sounds. No wheezing. Chest:      Chest wall: No mass or tenderness. Abdominal:      General: There is no distension. Palpations: Abdomen is soft. Tenderness: There is no abdominal tenderness. There is no guarding or rebound. Musculoskeletal:         General: No swelling, tenderness, deformity or signs of injury. Normal range of motion. Cervical back: Normal range of motion and neck supple. No rigidity. No muscular tenderness. Right lower leg: No tenderness. No edema. Left lower leg: No tenderness. No edema. Skin:     General: Skin is warm and dry. Capillary Refill: Capillary refill takes less than 2 seconds. Neurological:      General: No focal deficit present. Mental Status: He is alert and oriented to person, place, and time. Cranial Nerves: No cranial nerve deficit. Sensory: No sensory deficit. Motor: No weakness.       Coordination: Coordination normal.      Gait: Gait normal.   Psychiatric:         Mood and Affect: Mood normal.         Behavior: Behavior normal.          MDM  Number of Diagnoses or Management Options  Diagnosis management comments: 39-year-old male presents as above with chest pain and hypertension. He has potential focal pneumonia on x-ray in the region of his pain. Plan to treat with antibiotics. He will need to follow-up with primary care for management of his hypertension. Amount and/or Complexity of Data Reviewed  Clinical lab tests: reviewed  Tests in the radiology section of CPT®: reviewed      ED Course as of Sep 18 2013   Sat Sep 18, 2021   2007 ED EKG interpretation:Rhythm: normal sinus rhythm. Rate (approx.): 94. Axis: normal.  ST segment:  No concerning ST elevations or depressions. This EKG was interpreted by Radha Villasenor MD,ED Provider.     [JM]      ED Course User Index  [JM] Kj Mast MD       Procedures

## 2022-03-19 PROBLEM — E66.9 OBESITY: Status: ACTIVE | Noted: 2018-07-11

## 2022-03-19 PROBLEM — I10 HTN (HYPERTENSION): Status: ACTIVE | Noted: 2018-07-11

## 2022-03-20 PROBLEM — L03.90 CELLULITIS: Status: ACTIVE | Noted: 2018-07-11

## 2022-08-10 ENCOUNTER — TRANSCRIBE ORDER (OUTPATIENT)
Dept: SCHEDULING | Age: 37
End: 2022-08-10

## 2022-08-10 DIAGNOSIS — M25.519 SHOULDER PAIN: ICD-10-CM

## 2022-08-10 DIAGNOSIS — R20.0 NUMBNESS AND TINGLING IN LEFT ARM: Primary | ICD-10-CM

## 2022-08-10 DIAGNOSIS — R20.2 NUMBNESS AND TINGLING IN LEFT ARM: Primary | ICD-10-CM

## 2022-08-16 ENCOUNTER — HOSPITAL ENCOUNTER (OUTPATIENT)
Dept: MRI IMAGING | Age: 37
Discharge: HOME | End: 2022-08-16
Attending: ORTHOPAEDIC SURGERY
Payer: COMMERCIAL

## 2022-08-16 DIAGNOSIS — M25.519 SHOULDER PAIN: ICD-10-CM

## 2022-08-16 PROCEDURE — 72141 MRI NECK SPINE W/O DYE: CPT

## 2022-09-02 ENCOUNTER — TRANSCRIBE ORDER (OUTPATIENT)
Dept: SCHEDULING | Age: 37
End: 2022-09-02

## 2022-09-02 DIAGNOSIS — S49.92XD LEFT UPPER ARM INJURY, SUBSEQUENT ENCOUNTER: Primary | ICD-10-CM

## 2022-09-07 ENCOUNTER — TRANSCRIBE ORDER (OUTPATIENT)
Dept: SCHEDULING | Age: 37
End: 2022-09-07

## 2022-09-07 DIAGNOSIS — S49.92XD UNSPECIFIED INJURY OF LEFT SHOULDER AND UPPER ARM, SUBSEQUENT ENCOUNTER: Primary | ICD-10-CM

## 2023-04-21 DIAGNOSIS — S49.92XD LEFT UPPER ARM INJURY, SUBSEQUENT ENCOUNTER: Primary | ICD-10-CM

## 2023-04-21 DIAGNOSIS — S49.92XD UNSPECIFIED INJURY OF LEFT SHOULDER AND UPPER ARM, SUBSEQUENT ENCOUNTER: Primary | ICD-10-CM

## 2023-04-24 ENCOUNTER — HOSPITAL ENCOUNTER (OUTPATIENT)
Age: 38
Setting detail: OBSERVATION
Discharge: HOME | End: 2023-04-26
Attending: EMERGENCY MEDICINE | Admitting: INTERNAL MEDICINE
Payer: COMMERCIAL

## 2023-04-24 ENCOUNTER — APPOINTMENT (OUTPATIENT)
Dept: CT IMAGING | Age: 38
End: 2023-04-24
Attending: EMERGENCY MEDICINE
Payer: COMMERCIAL

## 2023-04-24 DIAGNOSIS — I10 PRIMARY HYPERTENSION: ICD-10-CM

## 2023-04-24 DIAGNOSIS — R07.89 CHEST PAIN RADIATING TO ARM: ICD-10-CM

## 2023-04-24 DIAGNOSIS — I24.9 ACS (ACUTE CORONARY SYNDROME) (HCC): ICD-10-CM

## 2023-04-24 DIAGNOSIS — R42 VERTIGO: ICD-10-CM

## 2023-04-24 DIAGNOSIS — R07.9 CHEST PAIN, UNSPECIFIED TYPE: Primary | ICD-10-CM

## 2023-04-24 DIAGNOSIS — R94.39 ABNORMAL STRESS TEST: ICD-10-CM

## 2023-04-24 DIAGNOSIS — I25.9 MYOCARDIAL ISCHEMIA: ICD-10-CM

## 2023-04-24 DIAGNOSIS — R07.9 CHEST PAIN RADIATING TO JAW: ICD-10-CM

## 2023-04-24 LAB
ALBUMIN SERPL-MCNC: 3.9 G/DL (ref 3.5–5)
ALBUMIN/GLOB SERPL: 1.1 (ref 1.1–2.2)
ALP SERPL-CCNC: 75 U/L (ref 45–117)
ALT SERPL-CCNC: 41 U/L (ref 12–78)
ANION GAP SERPL CALC-SCNC: 8 MMOL/L (ref 5–15)
AST SERPL-CCNC: 29 U/L (ref 15–37)
BASOPHILS # BLD: 0 K/UL (ref 0–0.1)
BASOPHILS NFR BLD: 1 % (ref 0–1)
BILIRUB SERPL-MCNC: 0.4 MG/DL (ref 0.2–1)
BUN SERPL-MCNC: 20 MG/DL (ref 6–20)
BUN/CREAT SERPL: 16 (ref 12–20)
CALCIUM SERPL-MCNC: 8.7 MG/DL (ref 8.5–10.1)
CHLORIDE SERPL-SCNC: 103 MMOL/L (ref 97–108)
CO2 SERPL-SCNC: 28 MMOL/L (ref 21–32)
CREAT SERPL-MCNC: 1.22 MG/DL (ref 0.7–1.3)
DIFFERENTIAL METHOD BLD: ABNORMAL
EOSINOPHIL # BLD: 0.2 K/UL (ref 0–0.4)
EOSINOPHIL NFR BLD: 3 % (ref 0–7)
ERYTHROCYTE [DISTWIDTH] IN BLOOD BY AUTOMATED COUNT: 13 % (ref 11.5–14.5)
GLOBULIN SER CALC-MCNC: 3.5 G/DL (ref 2–4)
GLUCOSE SERPL-MCNC: 96 MG/DL (ref 65–100)
HCT VFR BLD AUTO: 44.2 % (ref 36.6–50.3)
HGB BLD-MCNC: 14.5 G/DL (ref 12.1–17)
IMM GRANULOCYTES # BLD AUTO: 0 K/UL (ref 0–0.04)
IMM GRANULOCYTES NFR BLD AUTO: 0 % (ref 0–0.5)
LYMPHOCYTES # BLD: 2.5 K/UL (ref 0.8–3.5)
LYMPHOCYTES NFR BLD: 32 % (ref 12–49)
MCH RBC QN AUTO: 29.6 PG (ref 26–34)
MCHC RBC AUTO-ENTMCNC: 32.8 G/DL (ref 30–36.5)
MCV RBC AUTO: 90.2 FL (ref 80–99)
MONOCYTES # BLD: 0.5 K/UL (ref 0–1)
MONOCYTES NFR BLD: 7 % (ref 5–13)
NEUTS SEG # BLD: 4.6 K/UL (ref 1.8–8)
NEUTS SEG NFR BLD: 57 % (ref 32–75)
NRBC # BLD: 0 K/UL (ref 0–0.01)
NRBC BLD-RTO: 0 PER 100 WBC
PLATELET # BLD AUTO: 245 K/UL (ref 150–400)
PMV BLD AUTO: 8.5 FL (ref 8.9–12.9)
POTASSIUM SERPL-SCNC: 3.9 MMOL/L (ref 3.5–5.1)
PROT SERPL-MCNC: 7.4 G/DL (ref 6.4–8.2)
RBC # BLD AUTO: 4.9 M/UL (ref 4.1–5.7)
SODIUM SERPL-SCNC: 139 MMOL/L (ref 136–145)
TROPONIN I SERPL HS-MCNC: 4 NG/L (ref 0–76)
WBC # BLD AUTO: 7.9 K/UL (ref 4.1–11.1)

## 2023-04-24 PROCEDURE — 74011250636 HC RX REV CODE- 250/636: Performed by: EMERGENCY MEDICINE

## 2023-04-24 PROCEDURE — 80053 COMPREHEN METABOLIC PANEL: CPT

## 2023-04-24 PROCEDURE — 84484 ASSAY OF TROPONIN QUANT: CPT

## 2023-04-24 PROCEDURE — 99285 EMERGENCY DEPT VISIT HI MDM: CPT

## 2023-04-24 PROCEDURE — 85025 COMPLETE CBC W/AUTO DIFF WBC: CPT

## 2023-04-24 PROCEDURE — 93005 ELECTROCARDIOGRAM TRACING: CPT

## 2023-04-24 PROCEDURE — 74011250637 HC RX REV CODE- 250/637: Performed by: EMERGENCY MEDICINE

## 2023-04-24 PROCEDURE — 96374 THER/PROPH/DIAG INJ IV PUSH: CPT

## 2023-04-24 PROCEDURE — 36415 COLL VENOUS BLD VENIPUNCTURE: CPT

## 2023-04-24 PROCEDURE — 70450 CT HEAD/BRAIN W/O DYE: CPT

## 2023-04-24 RX ORDER — ACETAMINOPHEN 500 MG
500 TABLET ORAL
Status: COMPLETED | OUTPATIENT
Start: 2023-04-25 | End: 2023-04-24

## 2023-04-24 RX ORDER — SODIUM CHLORIDE 9 MG/ML
1000 INJECTION, SOLUTION INTRAVENOUS ONCE
Status: COMPLETED | OUTPATIENT
Start: 2023-04-25 | End: 2023-04-25

## 2023-04-24 RX ORDER — DIPHENHYDRAMINE HYDROCHLORIDE 50 MG/ML
50 INJECTION, SOLUTION INTRAMUSCULAR; INTRAVENOUS
Status: COMPLETED | OUTPATIENT
Start: 2023-04-25 | End: 2023-04-24

## 2023-04-24 RX ADMIN — SODIUM CHLORIDE 1000 ML: 9 INJECTION, SOLUTION INTRAVENOUS at 23:52

## 2023-04-24 RX ADMIN — DIPHENHYDRAMINE HYDROCHLORIDE 50 MG: 50 INJECTION, SOLUTION INTRAMUSCULAR; INTRAVENOUS at 23:51

## 2023-04-24 RX ADMIN — ACETAMINOPHEN 500 MG: 500 TABLET ORAL at 23:52

## 2023-04-25 ENCOUNTER — APPOINTMENT (OUTPATIENT)
Dept: NON INVASIVE DIAGNOSTICS | Age: 38
End: 2023-04-25
Attending: SPECIALIST
Payer: COMMERCIAL

## 2023-04-25 ENCOUNTER — APPOINTMENT (OUTPATIENT)
Dept: NUCLEAR MEDICINE | Age: 38
End: 2023-04-25
Attending: SPECIALIST
Payer: COMMERCIAL

## 2023-04-25 ENCOUNTER — APPOINTMENT (OUTPATIENT)
Dept: GENERAL RADIOLOGY | Age: 38
End: 2023-04-25
Attending: EMERGENCY MEDICINE
Payer: COMMERCIAL

## 2023-04-25 PROBLEM — R07.9 CHEST PAIN: Status: ACTIVE | Noted: 2023-04-25

## 2023-04-25 LAB
ALBUMIN SERPL-MCNC: 3.6 G/DL (ref 3.5–5)
ALBUMIN/GLOB SERPL: 1.1 (ref 1.1–2.2)
ALP SERPL-CCNC: 66 U/L (ref 45–117)
ALT SERPL-CCNC: 41 U/L (ref 12–78)
AMPHET UR QL SCN: POSITIVE
ANION GAP SERPL CALC-SCNC: 2 MMOL/L (ref 5–15)
APPEARANCE UR: CLEAR
AST SERPL-CCNC: 20 U/L (ref 15–37)
ATRIAL RATE: 79 BPM
BACTERIA URNS QL MICRO: NEGATIVE /HPF
BARBITURATES UR QL SCN: NEGATIVE
BENZODIAZ UR QL: NEGATIVE
BILIRUB SERPL-MCNC: 0.5 MG/DL (ref 0.2–1)
BILIRUB UR QL: NEGATIVE
BNP SERPL-MCNC: 21 PG/ML
BUN SERPL-MCNC: 14 MG/DL (ref 6–20)
BUN/CREAT SERPL: 16 (ref 12–20)
CALCIUM SERPL-MCNC: 8.6 MG/DL (ref 8.5–10.1)
CALCULATED P AXIS, ECG09: 48 DEGREES
CALCULATED R AXIS, ECG10: -14 DEGREES
CALCULATED T AXIS, ECG11: 24 DEGREES
CANNABINOIDS UR QL SCN: NEGATIVE
CHLORIDE SERPL-SCNC: 111 MMOL/L (ref 97–108)
CHOLEST SERPL-MCNC: 210 MG/DL
CO2 SERPL-SCNC: 25 MMOL/L (ref 21–32)
COCAINE UR QL SCN: NEGATIVE
COLOR UR: ABNORMAL
CREAT SERPL-MCNC: 0.88 MG/DL (ref 0.7–1.3)
DIAGNOSIS, 93000: NORMAL
DRUG SCRN COMMENT,DRGCM: ABNORMAL
EPITH CASTS URNS QL MICRO: ABNORMAL /LPF
ERYTHROCYTE [DISTWIDTH] IN BLOOD BY AUTOMATED COUNT: 12.7 % (ref 11.5–14.5)
EST. AVERAGE GLUCOSE BLD GHB EST-MCNC: 111 MG/DL
GLOBULIN SER CALC-MCNC: 3.4 G/DL (ref 2–4)
GLUCOSE SERPL-MCNC: 86 MG/DL (ref 65–100)
GLUCOSE UR STRIP.AUTO-MCNC: NEGATIVE MG/DL
HBA1C MFR BLD: 5.5 % (ref 4–5.6)
HCT VFR BLD AUTO: 48.7 % (ref 36.6–50.3)
HDLC SERPL-MCNC: 51 MG/DL
HDLC SERPL: 4.1 (ref 0–5)
HGB BLD-MCNC: 15.7 G/DL (ref 12.1–17)
HGB UR QL STRIP: ABNORMAL
HYALINE CASTS URNS QL MICRO: ABNORMAL /LPF (ref 0–5)
KETONES UR QL STRIP.AUTO: NEGATIVE MG/DL
LDLC SERPL CALC-MCNC: 138.6 MG/DL (ref 0–100)
LEUKOCYTE ESTERASE UR QL STRIP.AUTO: NEGATIVE
MCH RBC QN AUTO: 29.7 PG (ref 26–34)
MCHC RBC AUTO-ENTMCNC: 32.2 G/DL (ref 30–36.5)
MCV RBC AUTO: 92.1 FL (ref 80–99)
METHADONE UR QL: NEGATIVE
NITRITE UR QL STRIP.AUTO: NEGATIVE
NRBC # BLD: 0 K/UL (ref 0–0.01)
NRBC BLD-RTO: 0 PER 100 WBC
OPIATES UR QL: NEGATIVE
P-R INTERVAL, ECG05: 154 MS
PCP UR QL: NEGATIVE
PH UR STRIP: 6 (ref 5–8)
PLATELET # BLD AUTO: 240 K/UL (ref 150–400)
PMV BLD AUTO: 8.5 FL (ref 8.9–12.9)
POTASSIUM SERPL-SCNC: 4.2 MMOL/L (ref 3.5–5.1)
PROT SERPL-MCNC: 7 G/DL (ref 6.4–8.2)
PROT UR STRIP-MCNC: NEGATIVE MG/DL
Q-T INTERVAL, ECG07: 376 MS
QRS DURATION, ECG06: 98 MS
QTC CALCULATION (BEZET), ECG08: 431 MS
RBC # BLD AUTO: 5.29 M/UL (ref 4.1–5.7)
RBC #/AREA URNS HPF: ABNORMAL /HPF (ref 0–5)
SODIUM SERPL-SCNC: 138 MMOL/L (ref 136–145)
SP GR UR REFRACTOMETRY: 1.01 (ref 1–1.03)
TRIGL SERPL-MCNC: 102 MG/DL (ref ?–150)
TROPONIN I SERPL HS-MCNC: <3 NG/L (ref 0–57)
TROPONIN I SERPL HS-MCNC: <4 NG/L (ref 0–76)
TSH SERPL DL<=0.05 MIU/L-ACNC: 1.41 UIU/ML (ref 0.36–3.74)
UROBILINOGEN UR QL STRIP.AUTO: 0.2 EU/DL (ref 0.2–1)
VENTRICULAR RATE, ECG03: 79 BPM
VLDLC SERPL CALC-MCNC: 20.4 MG/DL
WBC # BLD AUTO: 5.3 K/UL (ref 4.1–11.1)
WBC URNS QL MICRO: ABNORMAL /HPF (ref 0–4)

## 2023-04-25 PROCEDURE — A9500 TC99M SESTAMIBI: HCPCS

## 2023-04-25 PROCEDURE — 99222 1ST HOSP IP/OBS MODERATE 55: CPT | Performed by: PSYCHIATRY & NEUROLOGY

## 2023-04-25 PROCEDURE — 74011250636 HC RX REV CODE- 250/636: Performed by: SPECIALIST

## 2023-04-25 PROCEDURE — 84443 ASSAY THYROID STIM HORMONE: CPT

## 2023-04-25 PROCEDURE — 81001 URINALYSIS AUTO W/SCOPE: CPT

## 2023-04-25 PROCEDURE — 97116 GAIT TRAINING THERAPY: CPT

## 2023-04-25 PROCEDURE — 36415 COLL VENOUS BLD VENIPUNCTURE: CPT

## 2023-04-25 PROCEDURE — 74011000250 HC RX REV CODE- 250: Performed by: NURSE PRACTITIONER

## 2023-04-25 PROCEDURE — 99204 OFFICE O/P NEW MOD 45 MIN: CPT | Performed by: SPECIALIST

## 2023-04-25 PROCEDURE — 85027 COMPLETE CBC AUTOMATED: CPT

## 2023-04-25 PROCEDURE — 74011250637 HC RX REV CODE- 250/637: Performed by: EMERGENCY MEDICINE

## 2023-04-25 PROCEDURE — 80061 LIPID PANEL: CPT

## 2023-04-25 PROCEDURE — 97161 PT EVAL LOW COMPLEX 20 MIN: CPT

## 2023-04-25 PROCEDURE — 80053 COMPREHEN METABOLIC PANEL: CPT

## 2023-04-25 PROCEDURE — 74011250637 HC RX REV CODE- 250/637: Performed by: NURSE PRACTITIONER

## 2023-04-25 PROCEDURE — 65270000046 HC RM TELEMETRY

## 2023-04-25 PROCEDURE — 83036 HEMOGLOBIN GLYCOSYLATED A1C: CPT

## 2023-04-25 PROCEDURE — 71046 X-RAY EXAM CHEST 2 VIEWS: CPT

## 2023-04-25 PROCEDURE — 78452 HT MUSCLE IMAGE SPECT MULT: CPT

## 2023-04-25 PROCEDURE — 80307 DRUG TEST PRSMV CHEM ANLYZR: CPT

## 2023-04-25 PROCEDURE — 74011000250 HC RX REV CODE- 250: Performed by: SPECIALIST

## 2023-04-25 PROCEDURE — 83880 ASSAY OF NATRIURETIC PEPTIDE: CPT

## 2023-04-25 PROCEDURE — 84484 ASSAY OF TROPONIN QUANT: CPT

## 2023-04-25 RX ORDER — BACLOFEN 10 MG/1
10 TABLET ORAL
Status: DISCONTINUED | OUTPATIENT
Start: 2023-04-25 | End: 2023-04-26 | Stop reason: HOSPADM

## 2023-04-25 RX ORDER — IBUPROFEN 600 MG/1
600 TABLET ORAL
Status: DISCONTINUED | OUTPATIENT
Start: 2023-04-25 | End: 2023-04-26 | Stop reason: HOSPADM

## 2023-04-25 RX ORDER — TAMSULOSIN HYDROCHLORIDE 0.4 MG/1
0.4 CAPSULE ORAL DAILY
Status: DISCONTINUED | OUTPATIENT
Start: 2023-04-25 | End: 2023-04-26 | Stop reason: HOSPADM

## 2023-04-25 RX ORDER — HEPARIN SODIUM 5000 [USP'U]/ML
5000 INJECTION, SOLUTION INTRAVENOUS; SUBCUTANEOUS EVERY 8 HOURS
Status: DISCONTINUED | OUTPATIENT
Start: 2023-04-25 | End: 2023-04-25

## 2023-04-25 RX ORDER — DEXTROAMPHETAMINE SACCHARATE, AMPHETAMINE ASPARTATE, DEXTROAMPHETAMINE SULFATE AND AMPHETAMINE SULFATE 2.5; 2.5; 2.5; 2.5 MG/1; MG/1; MG/1; MG/1
20 TABLET ORAL 2 TIMES DAILY
Status: DISCONTINUED | OUTPATIENT
Start: 2023-04-25 | End: 2023-04-26 | Stop reason: HOSPADM

## 2023-04-25 RX ORDER — ACETAMINOPHEN 325 MG/1
650 TABLET ORAL
Status: DISCONTINUED | OUTPATIENT
Start: 2023-04-25 | End: 2023-04-26 | Stop reason: HOSPADM

## 2023-04-25 RX ORDER — TRAMADOL HYDROCHLORIDE 50 MG/1
50 TABLET ORAL
Status: DISCONTINUED | OUTPATIENT
Start: 2023-04-25 | End: 2023-04-26 | Stop reason: HOSPADM

## 2023-04-25 RX ORDER — METHYLPHENIDATE HYDROCHLORIDE 36 MG/1
36 TABLET ORAL
COMMUNITY

## 2023-04-25 RX ORDER — GABAPENTIN 300 MG/1
300 CAPSULE ORAL 3 TIMES DAILY
COMMUNITY

## 2023-04-25 RX ORDER — REGADENOSON 0.08 MG/ML
0.4 INJECTION, SOLUTION INTRAVENOUS
Status: COMPLETED | OUTPATIENT
Start: 2023-04-25 | End: 2023-04-25

## 2023-04-25 RX ORDER — SODIUM CHLORIDE 0.9 % (FLUSH) 0.9 %
5-40 SYRINGE (ML) INJECTION EVERY 8 HOURS
Status: DISCONTINUED | OUTPATIENT
Start: 2023-04-25 | End: 2023-04-26 | Stop reason: HOSPADM

## 2023-04-25 RX ORDER — BACLOFEN 10 MG/1
20 TABLET ORAL
COMMUNITY

## 2023-04-25 RX ORDER — TETRAKIS(2-METHOXYISOBUTYLISOCYANIDE)COPPER(I) TETRAFLUOROBORATE 1 MG/ML
32.7 INJECTION, POWDER, LYOPHILIZED, FOR SOLUTION INTRAVENOUS
Status: COMPLETED | OUTPATIENT
Start: 2023-04-25 | End: 2023-04-25

## 2023-04-25 RX ORDER — ONDANSETRON 2 MG/ML
4 INJECTION INTRAMUSCULAR; INTRAVENOUS
Status: DISCONTINUED | OUTPATIENT
Start: 2023-04-25 | End: 2023-04-26 | Stop reason: HOSPADM

## 2023-04-25 RX ORDER — SODIUM CHLORIDE 0.9 % (FLUSH) 0.9 %
5-40 SYRINGE (ML) INJECTION AS NEEDED
Status: DISCONTINUED | OUTPATIENT
Start: 2023-04-25 | End: 2023-04-26 | Stop reason: HOSPADM

## 2023-04-25 RX ORDER — GABAPENTIN 300 MG/1
300 CAPSULE ORAL
Status: DISCONTINUED | OUTPATIENT
Start: 2023-04-26 | End: 2023-04-26 | Stop reason: HOSPADM

## 2023-04-25 RX ORDER — METHYLPHENIDATE HYDROCHLORIDE 5 MG/1
36 TABLET ORAL 2 TIMES DAILY
Status: DISCONTINUED | OUTPATIENT
Start: 2023-04-25 | End: 2023-04-26 | Stop reason: HOSPADM

## 2023-04-25 RX ORDER — DEXTROAMPHETAMINE SACCHARATE, AMPHETAMINE ASPARTATE, DEXTROAMPHETAMINE SULFATE AND AMPHETAMINE SULFATE 5; 5; 5; 5 MG/1; MG/1; MG/1; MG/1
20 TABLET ORAL 2 TIMES DAILY
COMMUNITY

## 2023-04-25 RX ORDER — ASPIRIN 325 MG
325 TABLET ORAL
Status: COMPLETED | OUTPATIENT
Start: 2023-04-25 | End: 2023-04-25

## 2023-04-25 RX ORDER — GUAIFENESIN 100 MG/5ML
81 LIQUID (ML) ORAL DAILY
Status: DISCONTINUED | OUTPATIENT
Start: 2023-04-25 | End: 2023-04-26 | Stop reason: HOSPADM

## 2023-04-25 RX ORDER — TRAMADOL HYDROCHLORIDE 50 MG/1
50 TABLET ORAL
COMMUNITY

## 2023-04-25 RX ORDER — GABAPENTIN 300 MG/1
300 CAPSULE ORAL 3 TIMES DAILY
Status: DISCONTINUED | OUTPATIENT
Start: 2023-04-25 | End: 2023-04-25

## 2023-04-25 RX ORDER — NITROGLYCERIN 0.4 MG/1
0.4 TABLET SUBLINGUAL
Status: COMPLETED | OUTPATIENT
Start: 2023-04-25 | End: 2023-04-25

## 2023-04-25 RX ORDER — SODIUM CHLORIDE 0.9 % (FLUSH) 0.9 %
20 SYRINGE (ML) INJECTION AS NEEDED
Status: DISCONTINUED | OUTPATIENT
Start: 2023-04-25 | End: 2023-04-26 | Stop reason: HOSPADM

## 2023-04-25 RX ADMIN — ASPIRIN 81 MG CHEWABLE TABLET 81 MG: 81 TABLET CHEWABLE at 09:30

## 2023-04-25 RX ADMIN — TETRAKIS(2-METHOXYISOBUTYLISOCYANIDE)COPPER(I) TETRAFLUOROBORATE 32.7 MILLICURIE: 1 INJECTION, POWDER, LYOPHILIZED, FOR SOLUTION INTRAVENOUS at 12:10

## 2023-04-25 RX ADMIN — SODIUM CHLORIDE, PRESERVATIVE FREE 10 ML: 5 INJECTION INTRAVENOUS at 09:30

## 2023-04-25 RX ADMIN — SODIUM CHLORIDE, PRESERVATIVE FREE 20 ML: 5 INJECTION INTRAVENOUS at 12:40

## 2023-04-25 RX ADMIN — SODIUM CHLORIDE, PRESERVATIVE FREE 10 ML: 5 INJECTION INTRAVENOUS at 21:51

## 2023-04-25 RX ADMIN — SODIUM CHLORIDE, PRESERVATIVE FREE 10 ML: 5 INJECTION INTRAVENOUS at 13:53

## 2023-04-25 RX ADMIN — ASPIRIN 325 MG ORAL TABLET 325 MG: 325 PILL ORAL at 01:11

## 2023-04-25 RX ADMIN — NITROGLYCERIN 0.4 MG: 0.4 TABLET, ORALLY DISINTEGRATING SUBLINGUAL at 01:11

## 2023-04-25 RX ADMIN — TAMSULOSIN HYDROCHLORIDE 0.4 MG: 0.4 CAPSULE ORAL at 09:30

## 2023-04-25 RX ADMIN — REGADENOSON 0.4 MG: 0.08 INJECTION, SOLUTION INTRAVENOUS at 12:40

## 2023-04-25 NOTE — ED NOTES
Mr. Rose Abraham stated that at approx. 17:00 he developed a Headache and began to feel dizzy while at work. He had something to eat and as the day progressed he developed left bicep, chest, jaw and back pain. Generalized weakness. VAN and Fast negative. States he feels exhausted.

## 2023-04-25 NOTE — ED TRIAGE NOTES
Presented to the ED with dizziness, confusion, left bicep pain, chest pain, jaw pain and nausea that began initial at approximately 17:00 while at work. Symptoms progress as the evening went on.

## 2023-04-25 NOTE — CONSULTS
[facility]                       Cardiology Care Note     [x]Initial Consult     []Progress note    Patient Name: Santino Renner - JGU:[id number] - GDQ:[id number]  Primary Cardiologist: none  Consulting Cardiologist: [employer] [facility] Physicians: Jessy Henderson MD     Reason for consult: left sided chest pain, left arm pain                  CARDIOLOGY ATTENDING ATTESTATION    CONSULT/PROGRESS NOTE      Patient seen on the day of progress note and examined  and agree with Advance Practice Provider (HEATHER, NP,PA)  assessment and plans. Time spent on patient's care >60% of entire combined MD/HEATHER evaluation/treatment      Brief HPI:37 y.o. male with PMH significant for JAHAIRA not on CPAP, hx of HTN not on medication, and obesity. Pt reports yesterday he was in his normal state of health when he started to feel dizzy, developed a band like pain in his left mid arm, weak, h/a, left chest pain, and between his shoulder blade pain. He felt nauseated. Denies sob, diaphoresis. Denies recent med changes, injury, life style changes/stressors. He saw his doctor last week and all was deemed good. In ED his HS troponin was NL. His EKG was NSR without acute ischemic changes. K 3.9, Creatinine 1.22. BP was mildly elevated. Patient to be noted that has issues with his back for a long time and cervical discopathy    He is taking medication for his neuropathy. The clinical presentation has typical and atypical features for cardiac etiology. This morning seems to be rather asymptomatic appears to be fairly drowsy with my examination but overall no symptoms reported this morning      A/P: 1. Chest pain: Some chest discomfort with left arm discomfort dizziness headache reported also lower infrascapular pain reported    The symptoms are difficult to pinpoint clinically and are typical and atypical from the cardiac standpoint.   He is a presentation EKG is normal essentially    His troponin is normal.    Pressure was a little elevated. Proceed with exercise Myoview. If normal may DC home from my standpoint    Tobacco cessation stressed    Encouraged to wear CPAP    Blood pressure as per primary team          BP Readings from Last 3 Encounters:   04/25/23 139/83   09/18/21 (!) 142/82   08/13/21 (!) 151/79       Pulse Readings from Last 3 Encounters:   04/25/23 61   09/18/21 90   08/13/21 83       Visit Vitals  /83 (BP 1 Location: Left upper arm, BP Patient Position: Sitting)   Pulse 61   Temp 97.7 °F (36.5 °C)   Resp 20   Ht 5' 9\" (1.753 m)   Wt 285 lb (129.3 kg)   SpO2 95%   BMI 42.09 kg/m²     General Appearance:  Well developed, well nourished,alert and oriented x 3, and individual in no acute distress. Ears/Nose/Mouth/Throat:   Hearing grossly normal.         Neck: Supple. Chest:   Lungs clear to auscultation bilaterally. Cardiovascular:  Regular rate and rhythm, S1, S2 normal, no murmur. Abdomen:   Soft, non-tender, bowel sounds are active. Extremities: No edema bilaterally. Skin: Warm and dry. Lab Results   Component Value Date/Time    Troponin-I, Qt. <0.05 09/18/2021 08:19 PM       Lab Results   Component Value Date/Time    Creatinine (POC) 1.0 12/28/2017 12:03 PM    Creatinine 0.88 04/25/2023 09:33 AM       Lab Results   Component Value Date/Time    Hemoglobin (POC) 15.3 12/28/2017 12:03 PM    HGB 15.7 04/25/2023 09:33 AM                         HPI:  Nacho Chance is a 44 y.o. male with PMH significant for JAHAIRA not on CPAP, hx of HTN not on medication, and obesity. Pt reports yesterday he was in his normal state of health when he started to feel dizzy, developed a band like pain in his left mid arm, weak, h/a, left chest pain, and between his shoulder blade pain. He felt nauseated. Denies sob, diaphoresis. Denies recent med changes, injury, life style changes/stressors.  He saw his doctor last week and all was deemed good. In ED his HS troponin was NL. His EKG was NSR without acute ischemic changes. K 3.9, Creatinine 1.22. BP was mildly elevated. SUBJECTIVE:  He denies symptoms at this time of CP, still with left arm band like pain. He cant recall if NTG helped his symptoms. His biological father had heart disease, doesn't know what age his symptoms started. His Paternal Uncle had MI and CABG, again doesn't recall age. He has never seen a cardiologist or had a cardiac evaluation. Assessment and Plan     Chest pain: pt presented to the hospital with c/o chest pain, mid back between shoulder blade pain, h/a, weakness, left arm pain, and nausea. His HS troponin 4,<4. EKG NSR without acute ischemic changes. Hx of untreated JAHAIRA, HTN not currently on medication, and obesity. Family hx from unknown age of CAD. Keep NPO for now. Further A/P per Dr Felix Lacy.          ____________________________________________________________    Cardiac testing              Most recent HS troponins:  Recent Labs     04/25/23  0050 04/24/23  2139   TROPHS <4 4       ECG: normal sinus rhythm    Review of Systems:    []All other systems reviewed and all negative except as written in HPI    [] Patient unable to provide secondary to condition    Past Medical History:   Diagnosis Date    Hypertension     Ill-defined condition 03/2017    herniated disk    Ill-defined condition 03/2017    T-4 cracks leaking fluid, but no rupture     No past surgical history on file. Social Hx:  reports that he quit smoking about 20 months ago. He smoked an average of 1.5 packs per day. He has quit using smokeless tobacco. He reports current alcohol use. He reports that he does not use drugs. Family Hx: family history is not on file.   Allergies   Allergen Reactions    Other Medication Other (comments)     Pt's mother has codeine \"allergy\" patient concerned he may also be allergic    Phenergan [Promethazine] Hives          OBJECTIVE:  Wt Readings from Last 3 Encounters:   04/24/23 129.3 kg (285 lb)   09/18/21 149.7 kg (330 lb 0.5 oz)   08/03/20 134.5 kg (296 lb 8.3 oz)     No intake or output data in the 24 hours ending 04/25/23 0849    Physical Exam:    Vitals:   Vitals:    04/25/23 0320 04/25/23 0405 04/25/23 0503 04/25/23 0635   BP: (!) 120/91 109/81 139/82 139/83   Pulse: 83 79 70 82   Resp:   15 20   Temp:    97.7 °F (36.5 °C)   SpO2: 92% 93% 94% 95%   Weight:       Height:         Telemetry: normal sinus rhythm    Gen: Well-developed, well-nourished, in no acute distress  Neck: Supple, No JVD, No Carotid Bruit  Resp: No accessory muscle use, Clear breath sounds, No rales or rhonchi  Card: Regular Rate,Rhythm, Normal S1, S2, No murmurs, rubs or gallop. No thrills. Abd:   Soft, non-tender, non-distended, BS+   MSK: No cyanosis  Skin: No rashes    Neuro: Moving all four extremities, follows commands appropriately  Psych: Good insight, oriented to person, place, alert, Nml Affect  LE: No edema    Data Review:     Radiology:   XR Results (most recent):  Results from Hospital Encounter encounter on 04/24/23    XR CHEST PA LAT    Narrative  EXAM: XR CHEST PA LAT    INDICATION: Chest pain    COMPARISON: 2021    TECHNIQUE: PA and lateral chest views    FINDINGS: The cardiac size is within normal limits. The pulmonary vasculature is  within normal limits. The lungs and pleural spaces are clear. The visualized bones and upper abdomen  are age-appropriate. Impression  Normal PA and lateral chest views. Recent Labs     04/24/23 2139      K 3.9      CO2 28   BUN 20   CREA 1.22   GLU 96   CA 8.7     Recent Labs     04/24/23 2139   WBC 7.9   HGB 14.5   HCT 44.2        Recent Labs     04/24/23 2139   AP 75     No results for input(s): CHOL, LDLC in the last 72 hours.     No lab exists for component: TGL, HDLC,  HBA1C      Current meds:    Current Facility-Administered Medications:     sodium chloride (NS) flush 5-40 mL, 5-40 mL, IntraVENous, Q8H, Mega Mendoza, NP    sodium chloride (NS) flush 5-40 mL, 5-40 mL, IntraVENous, PRN, Eloisa Carlson NP    acetaminophen (TYLENOL) tablet 650 mg, 650 mg, Oral, Q4H PRN, Eloisa Carlson NP    ondansetron (ZOFRAN) injection 4 mg, 4 mg, IntraVENous, Q4H PRN, Eloisa Carlson NP    ibuprofen (MOTRIN) tablet 600 mg, 600 mg, Oral, Q12H PRN, Eloisa Carlson NP    baclofen (LIORESAL) tablet 10 mg, 10 mg, Oral, Q8H PRN, Eloisa Carlson NP    [Held by provider] methylphenidate HCl (RITALIN) tablet 35 mg, 35 mg, Oral, BID, Eloisa Carlson NP    tamsulosin (FLOMAX) capsule 0.4 mg, 0.4 mg, Oral, DAILY, Eloisa Carlson NP    traMADoL (ULTRAM) tablet 50 mg, 50 mg, Oral, Q8H PRN, Eloisa Carlson NP    [Held by provider] dextroamphetamine-amphetamine (ADDERALL) tablet 20 mg, 20 mg, Oral, BID, Royal, Rosla, NP    aspirin chewable tablet 81 mg, 81 mg, Oral, DAILY, Eloisa Carlson NP Nelle Kato, NP    [facility]  Call center: N) [phone] (v) [phone]      Anselmo Mosley MD

## 2023-04-25 NOTE — CONSULTS
[address]    Name:  Dat Lin  MR#:  [id number]  :  1985  ACCOUNT #:  [de-identified]  DATE OF SERVICE:  2023      REQUESTING PHYSICIAN:  Glenny Barney NP    REASON FOR EVALUATION:  Dizziness. HISTORY OF PRESENT ILLNESS:  The patient is a 39-year-old male with history of hypertension, chronic neck and back pain, obstructive sleep apnea, ADHD, and morbid obesity. He says that he developed relatively sudden onset of dizziness, some difficulty with visual focusing, and left-sided retro-orbital headache yesterday afternoon. Symptoms were getting worse with positional changes such as bending over. At the same time, he was also experiencing chest discomfort and some paresthesias in the left upper extremity. He was admitted for further workup. He had a CT brain which was negative. Cardiac workup so far has been reportedly negative. Overall, his symptoms may be slightly better but still feels dizzy if he moves too quickly. No focal motor or sensory deficits, light sensitivity, nausea, or vomiting. PAST MEDICAL HISTORY:  As mentioned above. HOME MEDICATIONS:  1. Methylphenidate. 2.  Adderall. 3.  Tramadol. 4.  Baclofen. ALLERGIES:  PHENERGAN. SOCIAL HISTORY:  No history of smoking or alcohol use and no drug use. Works as a . PHYSICAL EXAMINATION:  GENERAL:  The patient is alert, fully oriented, answers all questions and follows commands. VITAL SIGNS:  Blood pressure 129/78, temperature 97.5, pulse is 106. HEART:  Regular rate and rhythm. CHEST:  Clear. ABDOMEN:  Soft, nontender. Positive bowel sounds. EXTREMITIES:  No edema. NEUROLOGIC:  Speech is clear. Comprehension is normal.  Pupils are equal, round, reactive. Visual fields are intact. There is no nystagmus. Face is symmetric. Hearing is baseline. Muscle tone and bulk normal.  Strength normal, both upper and lower extremities. DTRs 2/2 and symmetric. Toes are downgoing. Victoria-Hallpike testing is positive. LABORATORY DATA:  CBC is unremarkable. Chemistries are unremarkable. Lipid Profile: Total cholesterol 210, .6. Troponin less than 0.05. Hemoglobin A1c 5.5. CT scan of the brain is negative for any acute findings. MRI of the cervical spine in the past has shown mild multilevel degenerative changes with congenital narrowing of the spinal canal.    ASSESSMENT AND PLAN:  A 27-year-old male with the chronic neck pain and back pain, hypertension, attention deficit hyperactivity disorder, who developed the abrupt onset of dizziness yesterday afternoon. He describes this as a feeling of unsteadiness, things shifting in front of her front of his eyes and worsening with position. On exam, he has positional component to his symptoms and I suspect that this is likely peripheral vertigo. Modified Epley exercises were performed at the bedside and he may continue to do these on his own. Meclizine can be used as needed. Vestibular PT may also be helpful. No additional workup needed from a neurological standpoint at this time. Please call with any questions. Thank you for this consultation.       MD YEFRI [email or web address]  D:  04/25/2023 14:50  T:  04/25/2023 17:59  JOB #:  [id number]

## 2023-04-25 NOTE — PROGRESS NOTES
Bedside shift change report given to [facility] (oncoming nurse) by Antoine Altamirano (offgoing nurse). Report included the following information SBAR, Kardex, Intake/Output, MAR, and Cardiac Rhythm NSR .

## 2023-04-25 NOTE — CONSULTS
Consult dictated. 65-year-old with chronic neck pain and back pain, hypertension who developed relatively abrupt onset of dizziness yesterday afternoon. He describes this as a feeling of unsteadiness, things shifting in front of his eyes and worsening with position. On exam he has a positional component and I suspect that this is likely peripheral vertigo. Modified Epley exercises were performed at bedside and he may do these on his own. Meclizine as needed. No additional work-up needed at this time.   Oracio Mendoza MD

## 2023-04-25 NOTE — ED NOTES
TRANSFER - OUT REPORT:    Verbal report given to [place], RN(name) on [facility]  being transferred to [facility](unit) for routine progression of care       Report consisted of patients Situation, Background, Assessment and   Recommendations(SBAR). Information from the following report(s) SBAR, Kardex, ED Summary, MAR, Recent Results, and Med Rec Status was reviewed with the receiving nurse. Lines:   Peripheral IV 04/24/23 Right Antecubital (Active)   Site Assessment Clean, dry, & intact 04/24/23 2148   Phlebitis Assessment 0 04/24/23 2148   Infiltration Assessment 0 04/24/23 2148   Dressing Status Clean, dry, & intact 04/24/23 2148   Dressing Type Transparent 04/24/23 2148        Opportunity for questions and clarification was provided.       Patient transported with:   [employer]

## 2023-04-25 NOTE — H&P
History and Physical    Date of Service:  4/25/2023  Primary Care Provider: Nette Fuentes MD  Source of information: The patient    Chief Complaint: Dizziness, Shoulder Pain, Altered mental status, Chest Pain (Presented to the ED with dizziness, confusion, left bicep pain, chest pain, jaw pain and nausea that began initial at approximately 17:00 while at work. Symptoms progress as the evening went on. ), and Headache (Pre)      History of Presenting Illness:   Jose R Kc is a 44 y.o. male with a PMH of HTN (not on home meds, reported not needed since losing weight), Herniated Disc C spine, JAHAIRA not on CPAP, ADHD and Morbid Obesity who presented via EMS to the [address] [place] [address] with c/o new onset dizziness, blurry vision and HA, worsened with bending over and relieved with repositioning, also c/o chest pain described as sharp, midsternum and LUE pain with associated nausea. Hospitalist team consulted for further work up eval/tx and admit and patient transferred to [facility] [place]. The patient denied sore throat, trouble swallowing, trouble with speech, SOB, cough, fever, chills, V/D, abd pain, urinary symptoms, constipation, recent travels, sick contacts, focal or generalized neurological symptoms, falls, injuries, rashes, contact with COVID-19 diagnosed patients, hematemesis, melena, hemoptysis, hematuria, rashes, denies starting any new medications and denies any other concerns or problems besides as mentioned above. REVIEW OF SYSTEMS:  A comprehensive review of systems was negative except for that written in the History of Present Illness. Past Medical History:   Diagnosis Date    Hypertension     Ill-defined condition 03/2017    herniated disk    Ill-defined condition 03/2017    T-4 cracks leaking fluid, but no rupture      No past surgical history on file. Prior to Admission medications    Medication Sig Start Date End Date Taking?  Authorizing Provider   methylphenidate ER 36 mg 24 hr tab Take 1 Tablet by mouth every morning. Max Daily Amount: 72 mg. Yes Provider, Historical   dextroamphetamine-amphetamine (AdderalL) 20 mg tablet Take 1 Tablet by mouth two (2) times a day. Max Daily Amount: 40 mg. Yes Provider, Historical   traMADoL (ULTRAM) 50 mg tablet Take 1 Tablet by mouth. Yes Provider, Historical     Allergies   Allergen Reactions    Other Medication Other (comments)     Pt's mother has codeine \"allergy\" patient concerned he may also be allergic    Phenergan [Promethazine] Hives      No family history on file. Social History:  reports that he quit smoking about 20 months ago. He smoked an average of 1.5 packs per day. He has quit using smokeless tobacco. He reports current alcohol use. He reports that he does not use drugs. Social Determinants of Health     Tobacco Use: Not on file   Alcohol Use: Not on file   Financial Resource Strain: Not on file   Food Insecurity: Not on file   Transportation Needs: Not on file   Physical Activity: Not on file   Stress: Not on file   Social Connections: Not on file   Intimate Partner Violence: Not on file   Depression: Not on file   Housing Stability: Not on file        Medications were reconciled to the best of my ability given all available resources at the time of admission. Route is PO if not otherwise noted. Family and social history were personally reviewed, all pertinent and relevant details are outlined as above. Objective:   Visit Vitals  /83 (BP 1 Location: Left upper arm, BP Patient Position: Sitting)   Pulse 82   Temp 97.7 °F (36.5 °C)   Resp 20   Ht 5' 9\" (1.753 m)   Wt 129.3 kg (285 lb)   SpO2 95%   BMI 42.09 kg/m²      O2 Device: None    PHYSICAL EXAM:     Constitutional:  No acute distress, cooperative, pleasant    ENT:  Oral mucosa moist.    Resp:  CTA bilaterally. No wheezing/rhonchi/rales. No accessory muscle use. CV:  Regular rhythm, normal rate, S1,S2.    GI/:  Soft, obese, non tender, no guarding, BS present. Voids Freely. Musculoskeletal:  No edema, warm. Neurologic:  Moves all extremities. AAOx3. Skin:  w/d. Psych:  Not anxious nor agitated. Data Review:   I have independently reviewed and interpreted patient's lab and all other diagnostic data    Abnormal Labs Reviewed   CBC WITH AUTOMATED DIFF - Abnormal; Notable for the following components:       Result Value    MPV 8.5 (*)     All other components within normal limits       All Micro Results       None            IMAGING:   XR CHEST PA LAT   Final Result      Normal PA and lateral chest views. CT HEAD WO CONT   Final Result   No acute intracranial process. There is no acute fracture or dislocation identified. ECG/ECHO:    Results for orders placed or performed during the hospital encounter of 04/24/23   EKG, 12 LEAD, INITIAL   Result Value Ref Range    Ventricular Rate 79 BPM    Atrial Rate 79 BPM    P-R Interval 154 ms    QRS Duration 98 ms    Q-T Interval 376 ms    QTC Calculation (Bezet) 431 ms    Calculated P Axis 48 degrees    Calculated R Axis -14 degrees    Calculated T Axis 24 degrees    Diagnosis       Normal sinus rhythm  Normal ECG  When compared with ECG of 18-SEP-2021 20:02,  No significant change was found            Notes reviewed from all clinical/nonclinical/nursing services involved in patient's clinical care. Care coordination discussions were held with appropriate clinical/nonclinical/ nursing providers based on care coordination needs. Assessment:   Given the patient's current clinical presentation, there is a high level of concern for decompensation if discharged from the emergency department. Complex decision making was performed, which includes reviewing the patient's available past medical records, laboratory results, and imaging studies. Active Problems:    Chest pain (4/25/2023)        Plan:   ACS/Dizziness/Chest pain: possibly 2/2 polypharmacy, cervical radiculopathy  - CXR 4/25: no acute processes.    - CT Head wo: 4/24: no acute intracranial process, no acute fracture or dislocation identified. - EKG 4/24: NSR- 79  - ECHO  - troponin:4, BNP, lipids  - HgA1c, TSH  - UA, UDS  - Neurology  - Cardiology  - tele      HTN (not on home meds, reported not needed since losing weight): monitor VS/unit protocol. BP stable. ADHD: Adderall 20mg BID, Ritalin 35mg BID (hold these d/t dizziness)  Polypharmacy: likely contributing to dizziness. Adderall 20mg BID, Ritalin 35mg BID  Gabapentin 300mg TID  Tramadol 50mg TID prn  Baclofen 10mg TID       Herniated Disc Cervical spine: resume home gabapentin, flexeril, NSAIDs  JAHAIRA: not on CPAP. Morbid Obesity: BMI 42.09, low jamar, heart healthy diet advised      DIET: DIET NPO Sips of Water with Meds   ISOLATION PRECAUTIONS: There are currently no Active Isolations  CODE STATUS: Full Code   DVT PROPHYLAXIS: SCDs  FUNCTIONAL STATUS PRIOR TO HOSPITALIZATION: Fully active and ambulatory; able to carry on all self-care without restriction. Ambulatory status/function: By self   EARLY MOBILITY ASSESSMENT: Recommend routine ambulation while hospitalized with the assistance of nursing staff  ANTICIPATED DISCHARGE: Greater than 48 hours. ANTICIPATED DISPOSITION: Home  EMERGENCY CONTACT/SURROGATE DECISION MAKER: wife    CRITICAL CARE WAS PERFORMED FOR THIS ENCOUNTER: NO.      Signed By: Avelino Molina NP     April 25, 2023         Please note that this dictation may have been completed with Dragon, the [employer] voice recognition software. Quite often unanticipated grammatical, syntax, homophones, and other interpretive errors are inadvertently transcribed by the computer software. Please disregard these errors. Please excuse any errors that have escaped final proofreading.

## 2023-04-25 NOTE — PROGRESS NOTES
PHYSICAL THERAPY EVALUATION/DISCHARGE  Patient: Angel Arechiga (43 y.o. male)  Date: 4/25/2023  Primary Diagnosis: Chest pain [R07.9]       Precautions: None listed       ASSESSMENT  Based on the objective data described below, the patient presents with generalized weakness, fatigue, and impaired higher level balance with intermittent R foot drag without loss of balance. Appears generally fatigued and reports he feels he just needs to eat and get back on his normal medications. He does report some vague dizziness throughout and denies room spinning. Vitals stable from sitting to standing. He denies questions or concerns in regards to his mobility to [place] home with family support. Reports previous Outpatient PT for cervical radiculopathy but declines these services at this time. Reports symptoms improved overall this date and is not getting as dizzy leaning over. Acute PT will sign off. Functional Outcome Measure: The patient scored 23/24 on the [facility] outcome measure which is indicative of lower likelihood of need for ongoing therapy services at discharge. Other factors to consider for discharge: neuro consult pending     Further skilled acute physical therapy is not indicated at this time. PLAN :  Recommendation for discharge: (in order for the patient to meet his/her long term goals)  No skilled physical therapy/ follow up rehabilitation needs identified at this time. Declines Outpatient PT follow-up for cervical radiculopathy    This discharge recommendation:  Has not yet been discussed the attending provider and/or case management    IF patient discharges home will need the following DME: none     SUBJECTIVE:   Patient stated I just need to eat.     OBJECTIVE DATA SUMMARY:   HISTORY:    Past Medical History:   Diagnosis Date    Hypertension     Ill-defined condition 03/2017    herniated disk    Ill-defined condition 03/2017    T-4 cracks leaking fluid, but no rupture   History reviewed.  No pertinent surgical history. 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  Personal factors and/or comorbidities impacting plan of care: HTN    Home Situation  Home Environment: Private residence  # Steps to Enter: 6  One/Two Story Residence: Two story, live on 1st floor  Living Alone: No  Support Systems: Spouse/Significant Other  Patient Expects to be Discharged to[de-identified] Home  Current DME Used/Available at Home: None    EXAMINATION/PRESENTATION/DECISION MAKING:   Critical Behavior:  Neurologic State: Alert, Drowsy  Orientation Level: Oriented X4  Cognition: Follows commands, Decreased attention/concentration     Hearing: Auditory  Auditory Impairment: None  Skin:  grossly intact  Range Of Motion:  AROM: Within functional limits           PROM: Within functional limits           Strength:    Strength: Generally decreased, functional (B LEs - appears effort dependent)                    Tone & Sensation:   Tone: Normal              Sensation:  (baseline numbness/tingling B hands from cervical radiculopathy- denies changes)               Coordination:  Coordination: Within functional limits  Vision:    Blurry vision on admission  Functional Mobility:  Bed Mobility:     Supine to Sit: Independent     Scooting: Independent  Transfers:  Sit to Stand: Independent  Stand to Sit: Independent                       Balance:   Sitting: Intact  Standing: Impaired  Standing - Static: Good  Standing - Dynamic : Good;Fair (a couple instances of tripping on R foot)  Ambulation/Gait Training:  Distance (ft): 125 Feet (ft)  Assistive Device: Gait belt  Ambulation - Level of Assistance: Stand-by assistance;Supervision        Gait Abnormalities: Decreased step clearance; Path deviations (a couple instances dragging R foot)        Base of Support: Widened     Speed/Deborah: Pace decreased (<100 feet/min)  Step Length: Left shortened;Right shortened    Functional Measure:  [facility] AM-PAC®      Basic Mobility Inpatient Short Form (6-Clicks) Version 2  How much HELP from another person do you currently need. .. (If the patient hasn't done an activity recently, how much help from another person do you think they would need if they tried?) Total A Lot A Little None   1. Turning from your back to your side while in a flat bed without using bedrails? []  1 []  2 []  3  [x]  4   2. Moving from lying on your back to sitting on the side of a flat bed without using bedrails? []  1 []  2 []  3  [x]  4   3. Moving to and from a bed to a chair (including a wheelchair)? []  1 []  2 []  3  [x]  4   4. Standing up from a chair using your arms (e.g. wheelchair or bedside chair)? []  1 []  2 []  3  [x]  4   5. Walking in hospital room? []  1 []  2 []  3  [x]  4   6. Climbing 3-5 steps with a railing? []  1 []  2 [x]  3  []  4     Raw Score: 23/24                            Cutoff score ?171,2,3 had higher odds of discharging home with home health or need of SNF/IPR. 1509 Kody Larkin, Heron Chatman. Validity of the AM-PAC 6-Clicks Inpatient Daily Activity and Basic Mobility Short Forms. Physical Therapy Mar 2014, [phone]) 379-391; DOI: 10.2522/ptj.[id number]  2. Yolanda Blount. Association of AM-PAC \"6-Clicks\" Basic Mobility and Daily Activity Scores With Discharge Destination. Phys Ther. 2021 Apr 4;101(4):ikqg800. doi: 10.1093/ptj/igfm532. PMID: 21098184. V Jose Willett, Taniya MARI, Franko Dietz, Julio S. Activity Measure for Post-Acute Care \"6-Clicks\" Basic Mobility Scores Predict Discharge Destination After Acute Care Hospitalization in Select Patient Groups: A Retrospective, Observational Study. Arch Rehabil Res Clin Transl. 2022 Jul 16;4(3):019515. doi: 10.1016/j.arrct. 4625.205401. PMID: 57298991; PMCID: [id number]. 4. Johanny Suh, Theresa LUCAS, Chery KNOX. AM-PAC Short Forms Manual 4.0. Revised 2/2020.       Physical Therapy Evaluation Charge Determination   History Examination Presentation Decision-Making   LOW Complexity : Zero comorbidities / personal factors that will impact the outcome / POC LOW Complexity : 1-2 Standardized tests and measures addressing body structure, function, activity limitation and / or participation in recreation  LOW Complexity : Stable, uncomplicated  LOW Complexity : FOTO score of       Based on the above components, the patient evaluation is determined to be of the following complexity level: LOW     Pain Rating:  Did not interfere    Activity Tolerance:   Fair and some dizziness throughout session  Vitals stable from sitting to standing    After treatment patient left in no apparent distress:   Call bell within reach and sitting on edge of bed with Neurologist present    COMMUNICATION/EDUCATION:   The patients plan of care was discussed with: Registered nurse and Physician. Fall prevention education was provided and the patient/caregiver indicated understanding.     Thank you for this referral.  Candida Llamas, PT   Time Calculation: 16 mins

## 2023-04-25 NOTE — DISCHARGE INSTRUCTIONS
Radial Cardiac Catheterization/Angiography Discharge Instructions    It is normal to feel tired the first couple days. Take it easy and follow the physicians instructions. CHECK THE CATHETER INSERTION SITE DAILY:    Remove the wrist dressing 24 hours after the procedure. You may shower 24 hours after the procedure. Wash with soap and water and pat dry. Gentle cleaning of the site with soap and water is sufficient, cover with a dry clean dressing or bandage. Do not apply creams or powders to the area. No soaking the wrist for 3 days. Leave the puncture site open to air after 24 hours post-procedure. CALL THE PHYSICIANS:     If the site becomes red, swollen or feels warm to the touch  If there is bleeding or drainage or if there is unusual pain at the radial site. If there is any minor oozing, you may apply a band-aid and remove after 12 hours. If the bleeding continues, hold pressure with the middle finger against the puncture site and the thumb against the back of the wrist,call 911 to be transported to the hospital.  DO NOT DRIVE YOURSELF, \[facility]\"" 994. ACTIVITY:   For the first 24 hours do not manipulate the wrist.  No lifting, pushing or pulling over 3-5 pounds with the affected wrist for 7 daysand no straining the insertion site. Do not life grocery bags or the garbage can, do not run the vacuum  or  for 7 days. Start with short walks as in the hospital and gradually increase as tolerated each day. It is recommended to walk 30 minutes 5-7 days per week. Follow your physicians instructions on activity. Avoid walking outside in extremes of heat or cold. Walk inside when it is cold and windy or hot and humid. Things to keep in mind:  No driving for at least 24 hours, or as designated by your physician. Limit the number of times you go up and down the stairs  Take rests and pace yourself with activity.   Be careful and do not strain with bowel movements. MEDICATIONS:    Take all medications as prescribed  Call your physician if you have any questions  Keep an updated list of your medications with you at all times and give a list to your physician and pharmacist    AFTER CARE:   Follow up with your physician as instructed. Follow a heart healthy diet with proper portion control, daily stress management, daily exercise, blood pressure and cholesterol control , and smoking cessation. Please bring this form with you to show your primary care provider at your follow-up appointment. Primary care provider:   @[facility]@    Discharging provider:  Ricky Che MD    You have been admitted to the hospital with the following diagnoses:  Chest pain [R07.9]  Atypical chest pain [R07.89]    FOLLOW-UP CARE RECOMMENDATIONS:    APPOINTMENTS:  Follow-up with primary care provider,  @[facility]@  -  Please call [unfilled] shortly after discharge to set up an appointment to be seen in  1 week    Cardiology in 2 weeks     FOLLOW-UP TESTS recommended: Echocardiogram      PENDING TEST RESULTS:  At the time of your discharge the following test results are still pending: none   Please make sure you review these results with your outpatient follow-up provider(s). SYMPTOMS to watch for: chest pain, shortness of breath, fever, chills, nausea, vomiting, diarrhea, change in mentation, falling, weakness, bleeding. DIET/what to eat:  Cardiac Diet    ACTIVITY:  Activity as tolerated    What to do if new or unexpected symptoms occur? If you experience any of the above symptoms (or should other concerns or questions arise after discharge) please call your primary care physician. Return to the emergency room if you cannot get hold of your doctor. It is very important that you keep your follow-up appointment(s).   Please bring discharge papers, medication list (and/or medication bottles) to your follow-up appointments for review by your outpatient provider(s). Please check the list of medications and be sure it includes every medication (even non-prescription medications) that your provider wants you to take. It is important that you take the medication exactly as they are prescribed. Keep your medication in the bottles provided by the pharmacist and keep a list of the medication names, dosages, and times to be taken in your wallet. Do not take other medications without consulting your doctor. If you have any questions about your medications or other instructions, please talk to your nurse or care provider before you leave the hospital.    I understand that if any problems occur once I am at home I am to contact my physician. These instructions were explained to me and I had the opportunity to ask questions.

## 2023-04-25 NOTE — ED PROVIDER NOTES
Mr. Leonardo Milan stated that at approx. 17:00 he developed a Headache and began to feel dizzy while at work. He had something to eat and as the day progressed he developed left bicep, chest, jaw and back pain. Generalized weakness. States he feels exhausted. 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 He states he normally runs circles around all of the other mechanics but today he was entirely exhausted and could not do any work. He states his chest pain has resolved but he continues to have a left-sided headache in the eye and also still has left-sided jaw pain and left arm pain. Past Medical History:   Diagnosis Date    Hypertension     Ill-defined condition 2017    herniated disk    Ill-defined condition 2017    T-4 cracks leaking fluid, but no rupture       No past surgical history on file. No family history on file.     Social History     Socioeconomic History    Marital status:      Spouse name: Not on file    Number of children: Not on file    Years of education: Not on file    Highest education level: Not on file   Occupational History    Not on file   Tobacco Use    Smoking status: Former     Packs/day: 1.50     Types: Cigarettes     Quit date: 2021     Years since quittin.6    Smokeless tobacco: Former   Vaping Use    Vaping Use: Every day    Substances: Nicotine   Substance and Sexual Activity    Alcohol use: Yes     Comment: occassionally    Drug use: No    Sexual activity: Not on file   Other Topics Concern    Not on file   Social History Narrative    Not on file     Social Determinants of Health     Financial Resource Strain: Not on file   Food Insecurity: Not on file   Transportation Needs: Not on file   Physical Activity: Not on file   Stress: Not on file   Social Connections: Not on file   Intimate Partner Violence: Not on file   Housing Stability: Not on file         ALLERGIES: Codeine, Phenergan [promethazine], and Toradol [ketorolac tromethamine]    Review of Systems   HENT:          Jaw pain on left   Eyes:  Positive for pain. Cardiovascular:  Positive for chest pain. Musculoskeletal:  Positive for arthralgias and myalgias. Neurological:  Positive for headaches. Vitals:    04/24/23 2145 04/24/23 2211 04/24/23 2221 04/25/23 0111   BP: (!) 124/97 128/76 131/89 133/83   Pulse: 86 70  66   Resp: 16      Temp: 98.1 °F (36.7 °C)      SpO2: 100% 97% 97%    Weight: 129.3 kg (285 lb)      Height: 5' 9\" (1.753 m)               Physical Exam  Vitals and nursing note reviewed. Constitutional:       General: He is in acute distress. Appearance: Normal appearance. He is obese. He is ill-appearing. He is not toxic-appearing or diaphoretic. HENT:      Head: Normocephalic and atraumatic. Right Ear: External ear normal.      Left Ear: External ear normal.      Nose: Nose normal. No congestion or rhinorrhea. Mouth/Throat:      Mouth: Mucous membranes are moist.      Pharynx: Oropharynx is clear. No oropharyngeal exudate or posterior oropharyngeal erythema. Eyes:      General: No scleral icterus. Right eye: No discharge. Left eye: No discharge. Extraocular Movements: Extraocular movements intact. Conjunctiva/sclera: Conjunctivae normal.      Pupils: Pupils are equal, round, and reactive to light. Cardiovascular:      Rate and Rhythm: Normal rate and regular rhythm. Pulses: Normal pulses. Heart sounds: No murmur heard. No gallop. Pulmonary:      Effort: Pulmonary effort is normal. No respiratory distress. Breath sounds: Normal breath sounds. No stridor. No wheezing, rhonchi or rales. Chest:      Chest wall: No tenderness. Abdominal:      General: Abdomen is flat. Bowel sounds are normal. There is no distension. Palpations: Abdomen is soft. Tenderness: There is no abdominal tenderness. There is no guarding or rebound.    Musculoskeletal: General: No swelling or tenderness. Normal range of motion. Cervical back: Normal range of motion and neck supple. No rigidity or tenderness. Right lower leg: No edema. Left lower leg: No edema. Lymphadenopathy:      Cervical: No cervical adenopathy. Skin:     General: Skin is warm. Capillary Refill: Capillary refill takes less than 2 seconds. Findings: No erythema or rash. Neurological:      General: No focal deficit present. Mental Status: He is alert and oriented to person, place, and time. Mental status is at baseline. Cranial Nerves: No cranial nerve deficit. Motor: No weakness. Gait: Gait normal.   Psychiatric:         Mood and Affect: Mood normal.         Behavior: Behavior normal.         Thought Content: Thought content normal.         Judgment: Judgment normal.        Medical Decision Making  I considered the possibility of COVID, flu, nonspecific viral syndrome, pneumonia, gastritis, GERD, pancreatitis, hepatitis, cholecystitis, acute coronary syndrome, acute MI, pericarditis, myocarditis, dysrhythmias, CHF, pneumothorax, hemothorax, pleurisy, PE and other causes. Headache differential  I considered migraine headache, Sinus headache, tension headache, dehydration, COVID, flu, other viral illnesses, otitis media, mastoiditis, sinusitis, temporal arteritis, trigeminal neuralgia, carbon oxide poisoning, encephalitis, meningitis, electrolyte abnormalities, posttraumatic headache, and much less likely, intracranial hemorrhage or ischemic stroke. Amount and/or Complexity of Data Reviewed  Independent Historian: spouse  External Data Reviewed: radiology and notes. Details: Prior visits for chest pain, neck pain, chronic pain, degenerative disc disease and others. Labs: ordered. Details: I ordered, reviewed and interpreted the labs to be normal including CBC, chemistries and troponin.   Radiology: ordered and independent interpretation performed. Details: I personally reviewed and ordered and interpreted the CT of the head as  negative  ECG/medicine tests: ordered and independent interpretation performed. Decision-making details documented in ED Course. Details: EKG normal    Risk  OTC drugs. Prescription drug management. Decision regarding hospitalization. ED Course as of 04/25/23 0121 Tue Apr 25, 2023   0024 EKG performed at time 2145 demonstrated rate of 79, rhythm was normal sinus. There were no ST segment elevations or depressions and no ischemic changes. This is my independent and contemporaneous interpretation of the tracing which was good in quality [MN]   [address] for Admission  12:45 AM    ED Room Number: SER02/02  Patient Name and age:  Shirley Galarza 44 y.o.  male  Working Diagnosis: [unfilled]    COVID-Clickst Suspicion:  no  Sepsis present:  no  Reassessment needed: no  Code Status:  Full Code  Readmission: no  Isolation Requirements:  no  Recommended Level of Care:  telemetry  Department:[place] [facility] - [phone]  Other:  Hilda Montenegro [MN]   0119 Troponin-High Sensitivity: 4 [MN]   0119 WBC: 7.9 [MN]   0119 HGB: 14.5 [MN]   0119 HCT: 44.2 [MN]   0119 PLATELET: 127 [MN]   4953 METABOLIC PANEL, COMPREHENSIVE:    Sodium 139   Potassium 3.9   Chloride 103   CO2 28   Anion gap 8   Glucose 96   BUN 20   Creatinine 1.22   BUN/Creatinine ratio 16   eGFR >60   Calcium 8.7   Bilirubin, total 0.4   ALT 41   AST 29   Alk.  phosphatase 75   Protein, total 7.4   Albumin 3.9   Globulin 3.5   A-G Ratio 1.1  Normal [MN]      ED Course User Index  [MN] Scottie Lamar MD     Medications   acetaminophen (TYLENOL) tablet 500 mg (500 mg Oral Given 4/24/23 2352)   0.9% sodium chloride infusion 1,000 mL (1,000 mL IntraVENous New Bag 4/24/23 2352)   diphenhydrAMINE (BENADRYL) injection 50 mg (50 mg IntraVENous Given 4/24/23 2351)   aspirin tablet 325 mg (325 mg Oral Given 4/25/23 0111)   nitroglycerin (NITROSTAT) tablet 0.4 mg (0.4 mg SubLINGual Given 4/25/23 0111)       Procedures    Admitted        Encounter Diagnoses     ICD-10-CM ICD-9-CM   1. Chest pain, unspecified type  R07.9 786.50   2. ACS (acute coronary syndrome) (HCC)  I24.9 411.1   3. Chest pain radiating to jaw  R07.9 786.50   4.  Chest pain radiating to arm  R07.89 786.50

## 2023-04-25 NOTE — PROGRESS NOTES
Received patient from [address] [place] [address], via stretcher, A&Ox4 on RA, vital signs obtained, denies any c/o chest pain, dizziness, blurry vision, HA, observed ambulating from stretcher to bedside chair w/steady. No s/s of acute distress noted. End of Shift Note    Bedside shift change report given to Dwight Rosa RN (oncoming nurse) by Shani Burnett RN (offgoing nurse). Report included the following information SBAR, Kardex, ED Summary, MAR, and Recent Results    Shift worked:  [id number]     Shift summary and any significant changes:     Patient condition stable,  no s/s acute distress noted, Provide into she the patient, new orders given, patient at bedside.      Plan of Care:  TBD         Shani Burnett RN

## 2023-04-26 ENCOUNTER — APPOINTMENT (OUTPATIENT)
Dept: NUCLEAR MEDICINE | Age: 38
End: 2023-04-26
Attending: SPECIALIST
Payer: COMMERCIAL

## 2023-04-26 VITALS
DIASTOLIC BLOOD PRESSURE: 89 MMHG | RESPIRATION RATE: 20 BRPM | OXYGEN SATURATION: 95 % | WEIGHT: 285 LBS | HEART RATE: 84 BPM | HEIGHT: 69 IN | SYSTOLIC BLOOD PRESSURE: 134 MMHG | TEMPERATURE: 98.1 F | BODY MASS INDEX: 42.21 KG/M2

## 2023-04-26 PROBLEM — R07.89 ATYPICAL CHEST PAIN: Status: ACTIVE | Noted: 2023-04-26

## 2023-04-26 LAB
ALBUMIN SERPL-MCNC: 3.1 G/DL (ref 3.5–5)
ALBUMIN/GLOB SERPL: 1.1 (ref 1.1–2.2)
ALP SERPL-CCNC: 62 U/L (ref 45–117)
ALT SERPL-CCNC: 32 U/L (ref 12–78)
ANION GAP SERPL CALC-SCNC: 0 MMOL/L (ref 5–15)
AST SERPL-CCNC: 19 U/L (ref 15–37)
BILIRUB SERPL-MCNC: 0.2 MG/DL (ref 0.2–1)
BUN SERPL-MCNC: 19 MG/DL (ref 6–20)
BUN/CREAT SERPL: 21 (ref 12–20)
CALCIUM SERPL-MCNC: 8.3 MG/DL (ref 8.5–10.1)
CHLORIDE SERPL-SCNC: 115 MMOL/L (ref 97–108)
CO2 SERPL-SCNC: 24 MMOL/L (ref 21–32)
CREAT SERPL-MCNC: 0.91 MG/DL (ref 0.7–1.3)
D DIMER PPP FEU-MCNC: <0.19 MG/L FEU (ref 0–0.65)
ERYTHROCYTE [DISTWIDTH] IN BLOOD BY AUTOMATED COUNT: 12.7 % (ref 11.5–14.5)
GLOBULIN SER CALC-MCNC: 2.9 G/DL (ref 2–4)
GLUCOSE SERPL-MCNC: 131 MG/DL (ref 65–100)
HCT VFR BLD AUTO: 45 % (ref 36.6–50.3)
HGB BLD-MCNC: 14.5 G/DL (ref 12.1–17)
MCH RBC QN AUTO: 29.9 PG (ref 26–34)
MCHC RBC AUTO-ENTMCNC: 32.2 G/DL (ref 30–36.5)
MCV RBC AUTO: 92.8 FL (ref 80–99)
NRBC # BLD: 0 K/UL (ref 0–0.01)
NRBC BLD-RTO: 0 PER 100 WBC
PLATELET # BLD AUTO: 267 K/UL (ref 150–400)
PMV BLD AUTO: 8.6 FL (ref 8.9–12.9)
POTASSIUM SERPL-SCNC: 4.2 MMOL/L (ref 3.5–5.1)
PROT SERPL-MCNC: 6 G/DL (ref 6.4–8.2)
RBC # BLD AUTO: 4.85 M/UL (ref 4.1–5.7)
SODIUM SERPL-SCNC: 139 MMOL/L (ref 136–145)
STRESS BASELINE DIAS BP: 78 MMHG
STRESS BASELINE HR: 85 BPM
STRESS BASELINE ST DEPRESSION: 0 MM
STRESS BASELINE SYS BP: 129 MMHG
STRESS ESTIMATED WORKLOAD: 6.8 METS
STRESS EXERCISE DUR MIN: 4 MIN
STRESS EXERCISE DUR SEC: 37 SEC
STRESS PEAK DIAS BP: 80 MMHG
STRESS PEAK SYS BP: 150 MMHG
STRESS PERCENT HR ACHIEVED: 74 %
STRESS POST PEAK HR: 134 BPM
STRESS RATE PRESSURE PRODUCT: NORMAL BPM*MMHG
STRESS STAGE 1 BP: NORMAL MMHG
STRESS STAGE 1 DURATION: 1 MIN:SEC
STRESS STAGE 1 HR: 114 BPM
STRESS STAGE 2 DURATION: 1 MIN:SEC
STRESS STAGE 2 HR: 111 BPM
STRESS STAGE 3 BP: NORMAL MMHG
STRESS STAGE 3 DURATION: 1 MIN:SEC
STRESS STAGE 3 HR: 102 BPM
STRESS STAGE RECOVERY 1 BP: NORMAL MMHG
STRESS STAGE RECOVERY 1 DURATION: 1 MIN:SEC
STRESS STAGE RECOVERY 1 HR: 101 BPM
STRESS STAGE RECOVERY 2 DURATION: 1 MIN:SEC
STRESS STAGE RECOVERY 2 HR: 98 BPM
STRESS STAGE RECOVERY 3 DURATION: 1 MIN:SEC
STRESS STAGE RECOVERY 3 HR: 100 BPM
STRESS TARGET HR: 182 BPM
WBC # BLD AUTO: 7.8 K/UL (ref 4.1–11.1)

## 2023-04-26 PROCEDURE — 99152 MOD SED SAME PHYS/QHP 5/>YRS: CPT | Performed by: INTERNAL MEDICINE

## 2023-04-26 PROCEDURE — 77030010221 HC SPLNT WR POS TELE -B: Performed by: INTERNAL MEDICINE

## 2023-04-26 PROCEDURE — 85027 COMPLETE CBC AUTOMATED: CPT

## 2023-04-26 PROCEDURE — 80053 COMPREHEN METABOLIC PANEL: CPT

## 2023-04-26 PROCEDURE — 99153 MOD SED SAME PHYS/QHP EA: CPT | Performed by: INTERNAL MEDICINE

## 2023-04-26 PROCEDURE — C1894 INTRO/SHEATH, NON-LASER: HCPCS | Performed by: INTERNAL MEDICINE

## 2023-04-26 PROCEDURE — 74011000250 HC RX REV CODE- 250: Performed by: INTERNAL MEDICINE

## 2023-04-26 PROCEDURE — 74011000636 HC RX REV CODE- 636: Performed by: INTERNAL MEDICINE

## 2023-04-26 PROCEDURE — G0378 HOSPITAL OBSERVATION PER HR: HCPCS

## 2023-04-26 PROCEDURE — 74011250636 HC RX REV CODE- 250/636: Performed by: INTERNAL MEDICINE

## 2023-04-26 PROCEDURE — 77030013744: Performed by: INTERNAL MEDICINE

## 2023-04-26 PROCEDURE — 36415 COLL VENOUS BLD VENIPUNCTURE: CPT

## 2023-04-26 PROCEDURE — 77030019569 HC BND COMPR RAD TERU -B: Performed by: INTERNAL MEDICINE

## 2023-04-26 PROCEDURE — 93458 L HRT ARTERY/VENTRICLE ANGIO: CPT | Performed by: INTERNAL MEDICINE

## 2023-04-26 PROCEDURE — 85379 FIBRIN DEGRADATION QUANT: CPT

## 2023-04-26 PROCEDURE — 74011000250 HC RX REV CODE- 250: Performed by: NURSE PRACTITIONER

## 2023-04-26 PROCEDURE — 2709999900 HC NON-CHARGEABLE SUPPLY: Performed by: INTERNAL MEDICINE

## 2023-04-26 PROCEDURE — C1769 GUIDE WIRE: HCPCS | Performed by: INTERNAL MEDICINE

## 2023-04-26 PROCEDURE — 74011250637 HC RX REV CODE- 250/637: Performed by: NURSE PRACTITIONER

## 2023-04-26 PROCEDURE — 99215 OFFICE O/P EST HI 40 MIN: CPT | Performed by: SPECIALIST

## 2023-04-26 PROCEDURE — 77030040934 HC CATH DIAG DXTERITY MEDT -A: Performed by: INTERNAL MEDICINE

## 2023-04-26 RX ORDER — SODIUM CHLORIDE 0.9 % (FLUSH) 0.9 %
5-40 SYRINGE (ML) INJECTION EVERY 8 HOURS
Status: DISCONTINUED | OUTPATIENT
Start: 2023-04-26 | End: 2023-04-26 | Stop reason: HOSPADM

## 2023-04-26 RX ORDER — HEPARIN SODIUM 1000 [USP'U]/ML
INJECTION, SOLUTION INTRAVENOUS; SUBCUTANEOUS AS NEEDED
Status: DISCONTINUED | OUTPATIENT
Start: 2023-04-26 | End: 2023-04-26 | Stop reason: HOSPADM

## 2023-04-26 RX ORDER — MIDAZOLAM HYDROCHLORIDE 1 MG/ML
INJECTION, SOLUTION INTRAMUSCULAR; INTRAVENOUS AS NEEDED
Status: DISCONTINUED | OUTPATIENT
Start: 2023-04-26 | End: 2023-04-26 | Stop reason: HOSPADM

## 2023-04-26 RX ORDER — VERAPAMIL HYDROCHLORIDE 2.5 MG/ML
INJECTION, SOLUTION INTRAVENOUS AS NEEDED
Status: DISCONTINUED | OUTPATIENT
Start: 2023-04-26 | End: 2023-04-26 | Stop reason: HOSPADM

## 2023-04-26 RX ORDER — SODIUM CHLORIDE 0.9 % (FLUSH) 0.9 %
5-40 SYRINGE (ML) INJECTION AS NEEDED
Status: DISCONTINUED | OUTPATIENT
Start: 2023-04-26 | End: 2023-04-26 | Stop reason: HOSPADM

## 2023-04-26 RX ORDER — FENTANYL CITRATE 50 UG/ML
INJECTION, SOLUTION INTRAMUSCULAR; INTRAVENOUS AS NEEDED
Status: DISCONTINUED | OUTPATIENT
Start: 2023-04-26 | End: 2023-04-26 | Stop reason: HOSPADM

## 2023-04-26 RX ORDER — AMLODIPINE BESYLATE 5 MG/1
5 TABLET ORAL DAILY
Qty: 30 TABLET | Refills: 0 | Status: SHIPPED | OUTPATIENT
Start: 2023-04-26 | End: 2023-04-26 | Stop reason: SDUPTHER

## 2023-04-26 RX ORDER — LIDOCAINE HYDROCHLORIDE 10 MG/ML
INJECTION INFILTRATION; PERINEURAL AS NEEDED
Status: DISCONTINUED | OUTPATIENT
Start: 2023-04-26 | End: 2023-04-26 | Stop reason: HOSPADM

## 2023-04-26 RX ORDER — SODIUM CHLORIDE 9 MG/ML
100 INJECTION, SOLUTION INTRAVENOUS CONTINUOUS
Status: DISCONTINUED | OUTPATIENT
Start: 2023-04-26 | End: 2023-04-26 | Stop reason: HOSPADM

## 2023-04-26 RX ORDER — TETRAKIS(2-METHOXYISOBUTYLISOCYANIDE)COPPER(I) TETRAFLUOROBORATE 1 MG/ML
32.1 INJECTION, POWDER, LYOPHILIZED, FOR SOLUTION INTRAVENOUS
Status: COMPLETED | OUTPATIENT
Start: 2023-04-26 | End: 2023-04-26

## 2023-04-26 RX ORDER — AMLODIPINE BESYLATE 5 MG/1
5 TABLET ORAL DAILY
Qty: 30 TABLET | Refills: 0 | Status: SHIPPED | OUTPATIENT
Start: 2023-04-26

## 2023-04-26 RX ADMIN — SODIUM CHLORIDE, PRESERVATIVE FREE 10 ML: 5 INJECTION INTRAVENOUS at 06:13

## 2023-04-26 RX ADMIN — DEXTROAMPHETAMINE SACCHARATE, AMPHETAMINE ASPARTATE, DEXTROAMPHETAMINE SULFATE, AMPHETAMINE SULFATE TABLETS, 10 MG,CLL 20 MG: 2.5; 2.5; 2.5; 2.5 TABLET ORAL at 09:35

## 2023-04-26 RX ADMIN — ASPIRIN 81 MG CHEWABLE TABLET 81 MG: 81 TABLET CHEWABLE at 09:35

## 2023-04-26 RX ADMIN — TAMSULOSIN HYDROCHLORIDE 0.4 MG: 0.4 CAPSULE ORAL at 09:35

## 2023-04-26 RX ADMIN — TETRAKIS(2-METHOXYISOBUTYLISOCYANIDE)COPPER(I) TETRAFLUOROBORATE 32.1 MILLICURIE: 1 INJECTION, POWDER, LYOPHILIZED, FOR SOLUTION INTRAVENOUS at 07:25

## 2023-04-26 NOTE — PROGRESS NOTES
Attempts to call report to [facility]    1645  TRANSFER - OUT REPORT:    Verbal report given to [facility] RN(name) on Willie Khan  being transferred to (unit) for routine progression of care       Report consisted of patients Situation, Background, Assessment and   Recommendations(SBAR). Information from the following report(s) Procedure Summary, Intake/Output, MAR, Accordion, Recent Results, Med Rec Status, and Cardiac Rhythm SR  was reviewed with the receiving nurse. Lines:   Peripheral IV 04/24/23 Right Antecubital (Active)   Site Assessment Clean, dry, & intact 04/26/23 1131   Phlebitis Assessment 0 04/26/23 1131   Infiltration Assessment 0 04/26/23 1131   Dressing Status Clean, dry, & intact 04/26/23 1131   Dressing Type Transparent 04/26/23 1131   Hub Color/Line Status Purple;Flushed 04/26/23 1131   Action Taken Open ports on tubing capped 04/26/23 1131   Alcohol Cap Used Yes 04/26/23 1131        Opportunity for questions and clarification was provided.       Patient transported with:   Monitor  Registered Nurse  Transferred via stretcher to room 443 B, rec'd by floor nurse right radial cath site TR band no bleeding note

## 2023-04-26 NOTE — PROGRESS NOTES
[facility]  Cardiology Care Note                  []Initial visit     []Established visit     Patient Name: Griselda Patel - TQW:[id number] - KKS:[id number]  Primary Cardiologist: Ricky London MD  Consulting Cardiologist: Ricky London MD     Reason for initial visit:cp    HPI:   44 y.o. male with PMH significant for JAHAIRA not on CPAP, hx of HTN not on medication, and obesity. Pt reports yesterday he was in his normal state of health when he started to feel dizzy, developed a band like pain in his left mid arm, weak, h/a, left chest pain, and between his shoulder blade pain. He felt nauseated. Denies sob, diaphoresis. Denies recent med changes, injury, life style changes/stressors. He saw his doctor last week and all was deemed good. In ED his HS troponin was NL. His EKG was NSR without acute ischemic changes. K 3.9, Creatinine 1.22. BP was mildly elevated. SUBJECTIVE:  no recurrent cp       Assessment and Plan     1. Chest pain: Typical and atypical features. EKG without acute ischemic changes. Troponin was normal.  Multiple risk factors for coronary disease. Now status post nuclear stress test with suggestion of anteroseptal ischemia and ejection fraction of 50%. Discussed at length with patient. Proceed with cardiac catheterization. He understands risks and benefits. Blood pressure still elevated will defer to primary team.    Tobacco cessation again stressed. ____________________________________________________________    Cardiac testing        04/24/23    NUCLEAR CARDIAC STRESS TEST 04/25/2023, 04/26/2023 4/26/2023    Interpretation Summary    ECG: Resting ECG demonstrates normal sinus rhythm. ECG: Stress ECG was negative for ischemia. Stress Test: A pharmacological stress test was performed using lexiscan.  Blood pressure demonstrated a normal response and heart rate demonstrated a normal response to stress. The patient's heart rate recovery was normal. The patient reported no chest pain during the stress test.    MPI: Concern for anteroseptal ischemia. No convincing infarct. LVEF 50%. Rest and Lexiscan myocardial perfusion SPECT imaging is performed with IV injections of 32.7 and 32.1 mCi technetium 99m Sestamibi respectively. SPECT quantitative perfusion analysis and images displayed in three planes demonstrate mild anteroseptal reversible thinning concerning for ischemia. Mild fixed inferior wall thinning is more likely diaphragm attenuation than infarct. Gated SPECT quantitative analysis and images reviewed in 3 planes exhibit mild inferior wall hypokinesis and diminished LV systolic wall thickening. There is otherwise no segmental wall motion abnormality of the left ventricular myocardium. The calculated LV ejection fraction is 50%. Pulmonary uptake and left ventricular cavity size appear normal.    Impression  : Concern for anteroseptal ischemia. No convincing infarct. LVEF 50%. Signed by: Tawana Ashton MD on 4/25/2023  1:10 PM, Signed by: Larisa Christensen MD on 4/26/2023  9:34 AM          Most recent HS troponins:  Recent Labs     04/25/23  0933 04/25/23  0050 04/24/23  2139   TROPHS <3 <4 4     ECG: normal sinus rhythm  Review of Systems    []All other systems reviewed and all negative except as written in HPI    [] Patient unable to provide secondary to condition         Past Medical History:   Diagnosis Date    Hypertension     Ill-defined condition 03/2017    herniated disk    Ill-defined condition 03/2017    T-4 cracks leaking fluid, but no rupture     History reviewed. No pertinent surgical history. Social Hx:  reports that he quit smoking about 20 months ago. He smoked an average of 1.5 packs per day. He has quit using smokeless tobacco. He reports current alcohol use. He reports that he does not use drugs. Family Hx: family history is not on file.   Allergies   Allergen Reactions    Other Medication Other (comments)     Pt's mother has codeine \"allergy\" patient concerned he may also be allergic    Phenergan [Promethazine] Hives          OBJECTIVE:  Wt Readings from Last 3 Encounters:   04/25/23 285 lb (129.3 kg)   09/18/21 330 lb 0.5 oz (149.7 kg)   08/03/20 296 lb 8.3 oz (134.5 kg)       Intake/Output Summary (Last 24 hours) at 4/26/2023 1300  Last data filed at 4/26/2023 0732  Gross per 24 hour   Intake --   Output 1450 ml   Net -1450 ml         Physical Exam    Vitals:   Vitals:    04/26/23 1000 04/26/23 1131 04/26/23 1200 04/26/23 1239   BP:  137/87  (!) 144/103   Pulse: 74 70 86 78   Resp:  18  15   Temp:  98 °F (36.7 °C)  98.1 °F (36.7 °C)   SpO2:  93%  93%   Weight:       Height:         Telemetry: normal sinus rhythm    Visit Vitals  BP (!) 144/103 (BP 1 Location: Left arm, BP Patient Position: Semi fowlers)   Pulse 78   Temp 98.1 °F (36.7 °C)   Resp 15   Ht 5' 9\" (1.753 m)   Wt 285 lb (129.3 kg)   SpO2 93%   BMI 42.09 kg/m²     General Appearance:  Well developed, well nourished,alert and oriented x 3, and individual in no acute distress. Ears/Nose/Mouth/Throat:   Hearing grossly normal.         Neck: Supple. Chest:   Lungs clear to auscultation bilaterally. Cardiovascular:  Regular rate and rhythm, S1, S2 normal, no murmur. Abdomen:   Soft, non-tender, bowel sounds are active. Extremities: No edema bilaterally. Skin: Warm and dry. Data Review:     Radiology:   XR Results (most recent):  Results from Hospital Encounter encounter on 04/24/23    XR CHEST PA LAT    Narrative  EXAM: XR CHEST PA LAT    INDICATION: Chest pain    COMPARISON: 2021    TECHNIQUE: PA and lateral chest views    FINDINGS: The cardiac size is within normal limits. The pulmonary vasculature is  within normal limits. The lungs and pleural spaces are clear. The visualized bones and upper abdomen  are age-appropriate. Impression  Normal PA and lateral chest views.     CT Results (most recent):  Results from [place] encounter on 04/24/23    CT HEAD WO CONT    Narrative  CLINICAL HISTORY: headache  INDICATION: headache  COMPARISON: 2021. CT dose reduction was achieved through use of a standardized protocol tailored  for this examination and automatic exposure control for dose modulation. TECHNIQUE: Serial axial images with a collimation of 5 mm were obtained from the  skull base through the vertex  FINDINGS:  The sulci and ventricles are within normal limits for patient age. There is no  evidence of an acute infarction, hemorrhage, or mass-effect. There is no  evidence of midline shift or hydrocephalus. Posterior fossa structures are  unremarkable. No extra-axial collections are seen. Mastoid air cells are well pneumatized and clear. Left maxillary sinus disease. Impression  No acute intracranial process. There is no acute fracture or dislocation identified. MRI Results (most recent):  Results from [place] encounter on 08/16/22    MRI CERV SPINE WO CONT    Narrative  EXAM: MRI CERV SPINE WO CONT  Clinical history: Cervical radiculopathy  INDICATION: . Pain in unspecified shoulder    COMPARISON: None    TECHNIQUE: MR imaging of the cervical spine was performed using the following  sequences: sagittal T1, T2, STIR;  axial T2, T1.    CONTRAST:  None. FINDINGS:    Congenital narrowing of the cervical canal. No Chiari or syrinx. Vertebral body  heights are maintained. Marrow signal is normal. There is no focal cord signal  abnormality. The craniocervical junction is intact. The course, caliber, and signal intensity  of the spinal cord are normal.    The paraspinal soft tissues are within normal limits. C2-C3: No herniation or stenosis. C3-C4: No herniation or stenosis. C4-C5: Mild facet arthropathy. Disc desiccation and disc bulge. Minimal canal  stenosis. Mild bilateral foraminal stenoses. C5-C6: Mild facet arthropathy. Disc desiccation. Disc bulge. Minimal canal and  mild bilateral foraminal stenoses. C6-C7: Mild circumferential disc protrusion. Moderate canal stenosis. Mild  bilateral foraminal stenoses. C7-T1: No herniation or stenosis. Impression  Mild multilevel degenerative change with congenital narrowing of the cervical  canal.    There is moderate canal stenosis with mild bilateral foraminal stenosis C6-7. Additional, less severe degenerative findings are as described in detail above. No results for input(s): CPK, TROIQ in the last 72 hours.     No lab exists for component: CKQMB, CPKMB, BMPP  Recent Labs     04/26/23  0352 04/25/23  0933    138   K 4.2 4.2   * 111*   CO2 24 25   BUN 19 14   CREA 0.91 0.88   * 86   CA 8.3* 8.6     Recent Labs     04/26/23  0352 04/25/23  0933   WBC 7.8 5.3   HGB 14.5 15.7   HCT 45.0 48.7    240     Recent Labs     04/26/23  0352 04/25/23  0933   AP 62 66     Recent Labs     04/25/23  0933   CHOL 210*   LDLC 138.6*     Recent Labs     04/25/23  0933   TSH 1.41           Current meds:    Current Facility-Administered Medications:     sodium chloride (NS) flush 5-40 mL, 5-40 mL, IntraVENous, Q8H, Eloisa Carlson, NP, 10 mL at 04/26/23 0968    sodium chloride (NS) flush 5-40 mL, 5-40 mL, IntraVENous, PRN, Eloisa Carlson, NP    acetaminophen (TYLENOL) tablet 650 mg, 650 mg, Oral, Q4H PRN, Eloisa Carlson, NP    ondansetron (ZOFRAN) injection 4 mg, 4 mg, IntraVENous, Q4H PRN, Eloisa Carlson, NP    ibuprofen (MOTRIN) tablet 600 mg, 600 mg, Oral, Q12H PRN, Eloisa Carlson, NP    baclofen (LIORESAL) tablet 10 mg, 10 mg, Oral, Q8H PRN, Eloisa Carlson, NP    methylphenidate HCl (RITALIN) tablet 35 mg, 35 mg, Oral, BID, Eloisa Carlson, NP    tamsulosin (FLOMAX) capsule 0.4 mg, 0.4 mg, Oral, DAILY, Eloisa Carlson NP, 0.4 mg at 04/26/23 0935    traMADoL (ULTRAM) tablet 50 mg, 50 mg, Oral, Q8H PRN, Eloisa Carlson NP    dextroamphetamine-amphetamine (ADDERALL) tablet 20 mg, 20 mg, Oral, BID, Royal, MARVIN Amin, 20 mg at 04/26/23 0935    aspirin chewable tablet 81 mg, 81 mg, Oral, DAILY, Eloisa Carlson NP, 81 mg at 04/26/23 0935    saline peripheral flush soln 20 mL, 20 mL, InterCATHeter, PRN, Leopoldo Reams, Massimo, MD, 20 mL at 04/25/23 1240    gabapentin (NEURONTIN) capsule 300 mg, 300 mg, Oral, QHS, Eloisa Carlson NP Dorisann Edman, MD  [facility] [address] [address],[address]  [place] [address] [address] [facility]  [phone]      Re Sevilla MD

## 2023-04-26 NOTE — PROGRESS NOTES
To whomever it may concern     Patient Tawnya Shah was admitted to hospital from 4/25-4/26. He may return to work on 4/28. Please free to call with any questions.    Thank you  Dr Meera Gandhi

## 2023-04-26 NOTE — PROGRESS NOTES
CATH LAB to RECOVERY ROOM REPORT    Procedure: [facility]     MD:Dr. Anup Knight    The procedure was diagnostic only    Verbal Report given to Recovery Nurse on patient being transferred to Cardiac Cath Lab  for routine post-op. Patient stable upon transfer to . Vitals, mental status, MAR, procedural summary discussed with recovery RN.     Medication given during procedure:    Contrast:43mL                          Heparin:2.500units     Versed:2mg                                                               Fentanyl:25mcg                                                               Sheaths:    Right radial sheath pulled at 1415 pm, band at 14mL of air

## 2023-04-26 NOTE — PROGRESS NOTES
[place]    RUR - 5% low   Disposition  - home   Transportation  - spouse Guerda Salazar   Follow Up PCP/Specialist     PT/OT Consults active   Neurology following   Cardiology following   Abnormal Stress Test -Left Heart Cath/Coronary Angiography scheduled 4/26    Reason for Admission:  dizziness, confusion, left bicep pain, chest pain, jaw pain and nausea                     RUR Score:      5% low               Plan for utilizing home health:   no       PCP: First and Last name:  Paulo Ramírez MD     Name of Practice:    Are you a current patient: Yes/No:    Approximate date of last visit:    Can you participate in a virtual visit with your PCP:                     Current Advanced Directive/Advance Care Plan: Full Code      Healthcare Decision Maker:   Click here to complete [id number] including selection of the Healthcare Decision Maker Relationship (ie \"Primary\")           Hue Luis [phone]                  Transition of Care Plan:                      CM spoke with patient at bedside to introduce self and explain role. Verified demographics, emergency contact, insurance, PCP. Living situation:  lives w/spouse Hue Luis / 6 steps to enter 2 story home, lives on 1st fl. ADL's: independent   DME:  no medical equipment used   Pharmacy: [facility]   Discharge transportation: spouse Hue Luis   PT/OT/Rehab/HH history: no recent or prev rehab/HH/PT/OT    Care Management Interventions  PCP Verified by CM:  Yes  Mode of Transport at Discharge:  (spouse Hue Luis )  Transition of Care Consult (CM Consult):  (home)  Physical Therapy Consult: Yes  Occupational Therapy Consult: Yes  Speech Therapy Consult: No  Support Systems: Spouse/Significant Other  Confirm Follow Up Transport: Family  Discharge Location  Patient Expects to be Discharged to[de-identified] Mireya Sanchez RN, BSN, [facility]

## 2023-04-26 NOTE — PROGRESS NOTES
TRANSFER - IN REPORT:    Verbal report received from Marleni 149, tech on Michael Hair  being received from procedure for routine progression of care. Report consisted of patients Situation, Background, Assessment and Recommendations(SBAR). Information from the following report(s) Procedure Summary, MAR, Accordion, Recent Results, and Med Rec Status was reviewed with the receiving clinician. Opportunity for questions and clarification was provided. Assessment completed upon patients arrival to [address] [place] [address] and care assumed. Cardiac Cath Lab Recovery Arrival Note:    Michael Hair arrived to [facility] recovery area. Patient procedure= C. Patient on cardiac monitor, non-invasive blood pressure, SPO2 monitor. On room air. IV  of nacl on pump at 75 ml/hr. Patient status doing well without problems. Patient is A&Ox 4. Patient reports no complaints. PROCEDURE SITE CHECK:    Procedure site:without any bleeding and or hematoma, no pain/discomfort reported at procedure site. No change in patient status. Continue to monitor patient and status. 1445 pt tolerated food and drink. 2 ml of air removed pt c/o tingling in right hand. +2 pulse,   pt also stated that \"I have problems with my neck which causes my hand to be numb sometimes    Reviewed d/c instructions with pt.  Hard copy for him to take home  reinforced need for caution of right wrist area and use

## 2023-04-26 NOTE — PROGRESS NOTES
Problem: Falls - Risk of  Goal: *Absence of Falls  Description: Document Leodan [place] Fall Risk and appropriate interventions in the flowsheet. Outcome: Progressing Towards Goal  Note: Fall Risk Interventions:                                Problem: Patient Education: Go to Patient Education Activity  Goal: Patient/Family Education  Outcome: Progressing Towards Goal     Problem: Falls - Risk of  Goal: *Absence of Falls  Description: Document Leodan [place] Fall Risk and appropriate interventions in the flowsheet.   Outcome: Progressing Towards Goal  Note: Fall Risk Interventions:                                Problem: Patient Education: Go to Patient Education Activity  Goal: Patient/Family Education  Outcome: Progressing Towards Goal

## 2023-04-26 NOTE — PROGRESS NOTES
Report given to [facility] for pt going to cath lab today.  Pt aware and NPO since 0930

## 2023-04-26 NOTE — PROGRESS NOTES
Problem: Falls - Risk of  Goal: *Absence of Falls  Description: Document Arthuro Maillard Fall Risk and appropriate interventions in the flowsheet. Outcome: Progressing Towards Goal  Note: Fall Risk Interventions:                                Problem: Patient Education: Go to Patient Education Activity  Goal: Patient/Family Education  Outcome: Progressing Towards Goal     Problem: Falls - Risk of  Goal: *Absence of Falls  Description: Document Arthuro [facility] Fall Risk and appropriate interventions in the flowsheet.   Outcome: Progressing Towards Goal  Note: Fall Risk Interventions:                                Problem: Patient Education: Go to Patient Education Activity  Goal: Patient/Family Education  Outcome: Progressing Towards Goal

## 2023-04-26 NOTE — PROGRESS NOTES
Bedside and Verbal shift change report given to Guerrero Green (oncoming nurse) by Erika Jay (offgoing nurse). Report included the following information SBAR, Kardex, ED Summary, Intake/Output, MAR, and Cardiac Rhythm NSR .

## 2023-04-26 NOTE — PROGRESS NOTES
Stress test concerning for anteroseptal ischemia. EF 50%. D/w patient that he is a candidate for a cardiac cath. I discussed the risks and benefits of left cardiac catheterization +/- PCI with the patient who expressed understanding and wishes to proceed.

## 2023-04-26 NOTE — PROGRESS NOTES
Problem: Falls - Risk of  Goal: *Absence of Falls  Description: Document Irina Copeland Fall Risk and appropriate interventions in the flowsheet. 4/26/2023 1815 by Eliane Duverney, RN  Outcome: Resolved/Met  Note: Fall Risk Interventions:                             4/26/2023 1421 by Eliane Duverney, RN  Outcome: Progressing Towards Goal  Note: Fall Risk Interventions:                                Problem: Patient Education: Go to Patient Education Activity  Goal: Patient/Family Education  4/26/2023 1815 by Eliane Duverney, RN  Outcome: Resolved/Met  4/26/2023 1421 by Eliane Duverney, RN  Outcome: Progressing Towards Goal     Problem: Falls - Risk of  Goal: *Absence of Falls  Description: Document Irina Copeland Fall Risk and appropriate interventions in the flowsheet.   4/26/2023 1815 by Eliane Duverney, RN  Outcome: Resolved/Met  Note: Fall Risk Interventions:                             4/26/2023 1421 by Eliane Duverney, RN  Outcome: Progressing Towards Goal  Note: Fall Risk Interventions:                                Problem: Patient Education: Go to Patient Education Activity  Goal: Patient/Family Education  4/26/2023 1815 by Eliane Duverney, RN  Outcome: Resolved/Met  4/26/2023 1421 by Eliane Duverney, RN  Outcome: Progressing Towards Goal

## 2023-04-26 NOTE — PROGRESS NOTES
TRANSFER - IN Cath Howie RR REPORT:    Verbal report received from MONIKA Quiros(name) on Lissa Valenzuela  being received from 4W(unit) for ordered procedure      Report consisted of patients Situation, Background, Assessment and   Recommendations(SBAR). Information from the following report(s) SBAR, Kardex, ED Summary, and MAR was reviewed with the receiving nurse. Opportunity for questions and clarification was provided.

## 2023-04-26 NOTE — PROGRESS NOTES
Occupational Therapy:     Orders received and chart reviewed in prep for OT evaluation and tx. Discussed pt status with evaluating PT (completed 4/25) who states pt is independent, active, and completing ADLs without difficulty. Acute OT will sign off at this time, however given pt undergoing further cardiac workup please re-consult if pt has a decline in functional status.      Thank you,   Consuelo Lantigua, OTD, OTR/L

## 2023-04-26 NOTE — DISCHARGE SUMMARY
Discharge Summary     Patient:  Willard Camargo       MRN: [id number]       YOB: 1985       Age: 44 y.o. Date of admission:  4/24/2023    Date of discharge:  4/26/2023    Primary care provider: Dr. Sandra Polk MD    Admitting provider:  Marcello Hurd MD    Discharging provider:  David Gupta [address] [phone]. If unavailable, call [phone] and ask the  to page the triage hospitalist.    Consultations  IP CONSULT TO HOSPITALIST  IP CONSULT TO CARDIOLOGY  IP CONSULT TO NEUROLOGY    Procedures  Procedure(s):  LEFT HEART CATH / CORONARY ANGIOGRAPHY    Discharge destination: Home. The patient is stable for discharge. Admission diagnosis  Chest pain [R07.9]  Atypical chest pain [R07.89]    Current Discharge Medication List        START taking these medications    Details   amLODIPine (NORVASC) 5 mg tablet Take 1 Tablet by mouth daily. Qty: 30 Tablet, Refills: 0  Start date: 4/26/2023           CONTINUE these medications which have NOT CHANGED    Details   methylphenidate ER 36 mg 24 hr tab Take 1 Tablet by mouth every morning. Max Daily Amount: 72 mg.      dextroamphetamine-amphetamine (AdderalL) 20 mg tablet Take 1 Tablet by mouth two (2) times a day. Max Daily Amount: 40 mg.      traMADoL (ULTRAM) 50 mg tablet Take 1 Tablet by mouth. baclofen (LIORESAL) 10 mg tablet Take 2 Tablets by mouth three (3) times daily as needed for Muscle Spasm(s). gabapentin (NEURONTIN) 300 mg capsule Take 1 Capsule by mouth three (3) times daily. Max Daily Amount: 900 mg.              Follow-up Information       Follow up With Specialties Details Why Contact Info    Sandra Polk MD Family Medicine Follow up in 1 week(s)  [address] [facility]  [place] [address]      Myriam Frey MD Cardiovascular Disease Physician Follow up in 2 week(s)  [facility] [address] [facility]  [phone]              Final discharge diagnoses and brief hospital course  Lissa Valenzuela is a 44 y.o. male with a PMH of HTN (not on home meds, reported not needed since losing weight), Herniated Disc C spine, JAHAIRA not on CPAP, ADHD and Morbid Obesity who presented via EMS to the [address] [place] [address] with c/o new onset dizziness, blurry vision and HA, worsened with bending over and relieved with repositioning, also c/o chest pain described as sharp, midsternum and LUE pain with associated nausea. Hospitalist team consulted for further work up eval/tx and admit and patient transferred to [facility]. Atypical Chest pain: improved   ACS ruled out  - CXR 4/25: no acute processes. Normal D dimer   - troponin:4,   - HgA1c, 5.5  TSH wnl  - appreciate cardiology input   - Stress test positive: Concern for anteroseptal ischemia. No convincing infarct. LVEF 50%. - cardiac cath: Normal codominant coronary arteries  - echo as outpt     Dizziness - likely due to Peripheral Vertigo   - CT Head wo: 4/24: no acute intracranial process, no acute fracture or dislocation identified. - appreciate Neurology input         HTN - new  - started on po amlodipine    ADHD: Adderall 20mg BID, Ritalin 35mg BID   Herniated Disc Cervical spine: resume home gabapentin, flexeril, NSAIDs  JAHAIRA: not on CPAP. Morbid Obesity: BMI 42.09, low jamar, heart healthy diet advised    Discharge recommendations  PCP in 1-2 weeks   Cardiology f/u for echo      Physical examination at discharge  Visit Vitals  BP (!) 142/89   Pulse 84   Temp 98.1 °F (36.7 °C)   Resp 18   Ht 5' 9\" (1.753 m)   Wt 129.3 kg (285 lb)   SpO2 94%   BMI 42.09 kg/m²     GENERAL:  Alert, oriented, cooperative, no apparent distress  HEENT:  Normocephalic, atraumatic, non icteric sclerae, non pallor conjuctivae, EOMs intact, PERRLA. NECK: Supple, trachea midline, no adenopathy, no thyromegally or tenderness, no carotid bruit and no JVD.   LUNGS:   Vesicular breath sounds bilaterally, no added sounds. HEART:   S1 and S2 well heard,RRR,  no murmur, click, rub or gallop. ABDOMEB:   Soft, non-tender. Normoactive bowel sounds. No masses,  No organomegaly. EXTREMETIES:  Atraumatic, acyanotic, no edema  PULSES: 2+ and symmetric all extremities. SKIN:  No rashes or lesions  NEUROLOGY: Alert and oriented to PPT, CNII-XII intact. Motor and sensory exam grossly intact. Pertinent imaging studies:    Per EMR     Recent Labs     04/26/23 0352 04/25/23 0933 04/24/23 2139   WBC 7.8 5.3 7.9   HGB 14.5 15.7 14.5   HCT 45.0 48.7 44.2    240 245     Recent Labs     04/26/23 0352 04/25/23 0933 04/24/23 2139    138 139   K 4.2 4.2 3.9   * 111* 103   CO2 24 25 28   BUN 19 14 20   CREA 0.91 0.88 1.22   * 86 96   CA 8.3* 8.6 8.7     Recent Labs     04/26/23 0352 04/25/23 0933 04/24/23 2139   AP 62 66 75   TP 6.0* 7.0 7.4   ALB 3.1* 3.6 3.9   GLOB 2.9 3.4 3.5     No results for input(s): INR, PTP, APTT, INREXT in the last 72 hours. No results for input(s): FE, TIBC, PSAT, FERR in the last 72 hours. No results for input(s): PH, PCO2, PO2 in the last 72 hours. No results for input(s): CPK, CKMB in the last 72 hours. No lab exists for component: TROPONINI  No components found for: [place]    Chronic Diagnoses:    Problem List as of 4/26/2023 Date Reviewed: 7/11/2018            Codes Class Noted - Resolved    Atypical chest pain ICD-10-CM: R07.89  ICD-9-CM: 786.59  4/26/2023 - Present        Chest pain ICD-10-CM: R07.9  ICD-9-CM: 786.50  4/25/2023 - Present        Cellulitis ICD-10-CM: L03.90  ICD-9-CM: 682.9  7/11/2018 - Present        HTN (hypertension) ICD-10-CM: I10  ICD-9-CM: 401.9  7/11/2018 - Present        Obesity ICD-10-CM: E66.9  ICD-9-CM: 278.00  7/11/2018 - Present           Time spent on discharge related activities today greater than 30 minutes.       Signed:  Evi Rocha MD                 Hospitalist, Internal Medicine      Cc: Killian Banegas MD

## 2023-04-26 NOTE — PROGRESS NOTES
Physical Therapy    Chart reviewed. Patient evaluated and discharged from PT services yesterday. Discussed patient with RN who reported no changes or concerns with patient mobility. Now with stress test concerning for ischemia and patient going for [facility] +/- PCI. Patient reports improvement in dizziness today. Denies room spinning (denied room spinning yesterday with PT but reported some with neurology). Reports has been ambulating in penaloza and he denies any Acute PT needs - declines further PT at this time. If vestibular symptoms persist, could benefit from outpatient vestibular PT but undergoing further cardiac workup at this time. Will sign off.     Bimal Abraham, PT, DPT

## 2023-04-26 NOTE — PROGRESS NOTES
Cardiac Cath Lab Procedure Area Arrival Note:    Nitin Taylor arrived to Cardiac Cath Lab, Procedure Area. Patient identifiers verified with NAME and DATE OF BIRTH. Procedure verified with patient. Consent forms verified. Allergies verified. Patient informed of procedure and plan of care. Questions answered with review. Patient voiced understanding of procedure and plan of care. Patient on cardiac monitor, non-invasive blood pressure, SPO2 monitor. On oxygen or O2 @ 2 lpm via nc. IV of ns on pump at 40 ml/hr. Patient status doing well without problems. Patient is A&Ox 4. Patient reports no complaints. Patient medicated during procedure with orders obtained and verified by Dr. Ashley Alexander. Refer to patients Cardiac Cath Lab PROCEDURE REPORT for vital signs, assessment, status, and response during procedure, printed at end of case. Printed report on chart or scanned into chart.

## 2023-05-26 RX ORDER — TRAMADOL HYDROCHLORIDE 50 MG/1
50 TABLET ORAL
COMMUNITY

## 2023-05-26 RX ORDER — DEXTROAMPHETAMINE SACCHARATE, AMPHETAMINE ASPARTATE, DEXTROAMPHETAMINE SULFATE AND AMPHETAMINE SULFATE 5; 5; 5; 5 MG/1; MG/1; MG/1; MG/1
20 TABLET ORAL 2 TIMES DAILY
COMMUNITY

## 2023-05-26 RX ORDER — AMLODIPINE BESYLATE 5 MG/1
5 TABLET ORAL DAILY
COMMUNITY
Start: 2023-04-26

## 2023-05-26 RX ORDER — METHYLPHENIDATE HYDROCHLORIDE 36 MG/1
36 TABLET, EXTENDED RELEASE ORAL
COMMUNITY

## 2023-05-26 RX ORDER — GABAPENTIN 300 MG/1
300 CAPSULE ORAL 3 TIMES DAILY
COMMUNITY

## 2023-05-26 RX ORDER — BACLOFEN 10 MG/1
20 TABLET ORAL 3 TIMES DAILY PRN
COMMUNITY

## 2024-01-01 ENCOUNTER — HOSPITAL ENCOUNTER (EMERGENCY)
Facility: HOSPITAL | Age: 39
Discharge: HOME | End: 2024-01-01
Attending: STUDENT IN AN ORGANIZED HEALTH CARE EDUCATION/TRAINING PROGRAM
Payer: COMMERCIAL

## 2024-01-01 VITALS
HEIGHT: 69 IN | OXYGEN SATURATION: 100 % | BODY MASS INDEX: 46.65 KG/M2 | WEIGHT: 315 LBS | SYSTOLIC BLOOD PRESSURE: 148 MMHG | RESPIRATION RATE: 16 BRPM | HEART RATE: 117 BPM | DIASTOLIC BLOOD PRESSURE: 90 MMHG | TEMPERATURE: 97.9 F

## 2024-01-01 DIAGNOSIS — M25.561 CHRONIC PAIN OF RIGHT KNEE: Primary | ICD-10-CM

## 2024-01-01 DIAGNOSIS — G89.29 CHRONIC PAIN OF RIGHT KNEE: Primary | ICD-10-CM

## 2024-01-01 PROCEDURE — 99283 EMERGENCY DEPT VISIT LOW MDM: CPT

## 2024-01-01 RX ORDER — PREDNISONE 20 MG/1
40 TABLET ORAL DAILY
Qty: 10 TABLET | Refills: 0 | Status: SHIPPED | OUTPATIENT
Start: 2024-01-01 | End: 2024-01-06

## 2024-01-01 ASSESSMENT — PAIN DESCRIPTION - DESCRIPTORS: DESCRIPTORS: ACHING

## 2024-01-01 ASSESSMENT — PAIN SCALES - GENERAL: PAINLEVEL_OUTOF10: 8

## 2024-01-01 ASSESSMENT — PAIN DESCRIPTION - ORIENTATION: ORIENTATION: RIGHT

## 2024-01-01 ASSESSMENT — PAIN - FUNCTIONAL ASSESSMENT: PAIN_FUNCTIONAL_ASSESSMENT: 0-10

## 2024-01-01 ASSESSMENT — PAIN DESCRIPTION - LOCATION: LOCATION: KNEE

## 2024-01-01 NOTE — ED PROVIDER NOTES
Extraocular Movements: Extraocular movements intact.      Conjunctiva/sclera: Conjunctivae normal.   Cardiovascular:      Rate and Rhythm: Normal rate.   Pulmonary:      Effort: Pulmonary effort is normal.   Musculoskeletal:         General: Swelling and tenderness present. Normal range of motion.      Cervical back: Normal range of motion.      Comments: Patient has no specific tenderness, no erythema or fluctuance concerning for septic joint.  Joint is relatively stable on Lachman's and valgus and valgus stress.   Skin:     Coloration: Skin is not jaundiced.      Findings: No erythema.   Neurological:      General: No focal deficit present.      Mental Status: He is alert and oriented to person, place, and time.   Psychiatric:         Mood and Affect: Mood normal.         Behavior: Behavior normal.           ED Course:           Medications Given:  Medications - No data to display    Differential Diagnosis included but not limited to: Acute on chronic knee pain, soft tissue pain, ligamentous injury, meniscal injury    Medical Decision Making  Patient is a 38-year-old male presented ED with right knee pain.  Pain is acute on chronic.  Patient has seen orthopedic surgery and they are working on conservative management.  No new trauma.  No concerning findings for septic joint.  Patient is ambulatory and only has pain with certain movements.  Patient cannot take Toradol due to the reaction.  Does not want narcotic pain medication.  Patient requesting steroid injection but that steroid injections or not ED procedure.  Patient stated understanding.  Will give oral steroids at patient's request and recommend Tylenol and NSAIDs and close follow-up with orthopedic surgery for further outpatient evaluation.    Amount and/or Complexity of Data Reviewed  External Data Reviewed: notes.    Risk  Prescription drug management.          Procedures       DISPOSITION: Decision To Discharge 01/01/2024 02:18:35 PM    CLINICAL

## 2024-01-01 NOTE — ED NOTES
Patient discharged prior to this RN assessment.     Provider has reviewed discharge instructions with the patient/family.  The patient/family verbalized understanding instructions as well as need for follow up for any further symptoms.     Discharge papers given, education provided, and any questions answered. Patient discharged by provider.

## 2024-01-01 NOTE — ED TRIAGE NOTES
Pt ambulatory to ED c/o worsening R chronic knee pain worsening over the past 3-4 months. Requesting cortisone shot.

## 2024-01-01 NOTE — DISCHARGE INSTRUCTIONS
You presented to the ED with knee pain. Imaging shows no signs of fracture. You most likely have muscle skeletal injury pain.  No signs of infection or new trauma.  This is likely acute on chronic pain.  You may have soft tissue injury and so therefore outpatient follow-up with orthopedic surgery is indicated.  Contact orthopedic surgeon.  Symptomatic management is recommended with ice for the first 2 days followed by heat. I recommend taking Tylenol 1000mg every 6 hours as well as an NSAID such as Aleve (440mg/500mg every 12 hours) or Motrin(600mg every 6 hours). You may also try lidocaine patches if you have pinpoint pain or tenderness, these are available over-the-counter at the pharmacy as Salonpas or 4% lidocaine patch. Bed rest is not recommended.  Follow-up with your orthopedic surgeon for further outpatient evaluation.

## 2025-04-15 ENCOUNTER — APPOINTMENT (OUTPATIENT)
Facility: HOSPITAL | Age: 40
End: 2025-04-15
Payer: COMMERCIAL

## 2025-04-15 ENCOUNTER — HOSPITAL ENCOUNTER (EMERGENCY)
Facility: HOSPITAL | Age: 40
Discharge: HOME | End: 2025-04-15
Attending: STUDENT IN AN ORGANIZED HEALTH CARE EDUCATION/TRAINING PROGRAM
Payer: COMMERCIAL

## 2025-04-15 VITALS
RESPIRATION RATE: 14 BRPM | OXYGEN SATURATION: 96 % | HEIGHT: 69 IN | TEMPERATURE: 98.9 F | WEIGHT: 315 LBS | BODY MASS INDEX: 46.65 KG/M2 | SYSTOLIC BLOOD PRESSURE: 173 MMHG | HEART RATE: 66 BPM | DIASTOLIC BLOOD PRESSURE: 95 MMHG

## 2025-04-15 DIAGNOSIS — R07.9 CHEST PAIN, UNSPECIFIED TYPE: ICD-10-CM

## 2025-04-15 DIAGNOSIS — G43.909 MIGRAINE WITHOUT STATUS MIGRAINOSUS, NOT INTRACTABLE, UNSPECIFIED MIGRAINE TYPE: Primary | ICD-10-CM

## 2025-04-15 LAB
ALBUMIN SERPL-MCNC: 3.8 G/DL (ref 3.5–5)
ALBUMIN/GLOB SERPL: 0.9 (ref 1.1–2.2)
ALP SERPL-CCNC: 83 U/L (ref 45–117)
ALT SERPL-CCNC: 30 U/L (ref 12–78)
ANION GAP SERPL CALC-SCNC: 13 MMOL/L (ref 2–12)
AST SERPL-CCNC: 14 U/L (ref 15–37)
BASOPHILS # BLD: 0.04 K/UL (ref 0–0.1)
BASOPHILS NFR BLD: 0.4 % (ref 0–1)
BILIRUB SERPL-MCNC: 0.6 MG/DL (ref 0.2–1)
BUN SERPL-MCNC: 12 MG/DL (ref 6–20)
BUN/CREAT SERPL: 13 (ref 12–20)
CALCIUM SERPL-MCNC: 9.3 MG/DL (ref 8.5–10.1)
CHLORIDE SERPL-SCNC: 102 MMOL/L (ref 97–108)
CO2 SERPL-SCNC: 25 MMOL/L (ref 21–32)
COMMENT:: NORMAL
CREAT SERPL-MCNC: 0.96 MG/DL (ref 0.7–1.3)
DIFFERENTIAL METHOD BLD: ABNORMAL
EKG ATRIAL RATE: 66 BPM
EKG DIAGNOSIS: NORMAL
EKG P AXIS: 40 DEGREES
EKG P-R INTERVAL: 142 MS
EKG Q-T INTERVAL: 382 MS
EKG QRS DURATION: 96 MS
EKG QTC CALCULATION (BAZETT): 400 MS
EKG R AXIS: -3 DEGREES
EKG T AXIS: 47 DEGREES
EKG VENTRICULAR RATE: 66 BPM
EOSINOPHIL # BLD: 0.09 K/UL (ref 0–0.4)
EOSINOPHIL NFR BLD: 0.8 % (ref 0–7)
ERYTHROCYTE [DISTWIDTH] IN BLOOD BY AUTOMATED COUNT: 12.9 % (ref 11.5–14.5)
GLOBULIN SER CALC-MCNC: 4.3 G/DL (ref 2–4)
GLUCOSE SERPL-MCNC: 110 MG/DL (ref 65–100)
HCT VFR BLD AUTO: 48.1 % (ref 36.6–50.3)
HGB BLD-MCNC: 16.2 G/DL (ref 12.1–17)
IMM GRANULOCYTES # BLD AUTO: 0.03 K/UL (ref 0–0.04)
IMM GRANULOCYTES NFR BLD AUTO: 0.3 % (ref 0–0.5)
LYMPHOCYTES # BLD: 1.45 K/UL (ref 0.8–3.5)
LYMPHOCYTES NFR BLD: 12.8 % (ref 12–49)
MCH RBC QN AUTO: 28.9 PG (ref 26–34)
MCHC RBC AUTO-ENTMCNC: 33.7 G/DL (ref 30–36.5)
MCV RBC AUTO: 85.7 FL (ref 80–99)
MONOCYTES # BLD: 0.65 K/UL (ref 0–1)
MONOCYTES NFR BLD: 5.7 % (ref 5–13)
NEUTS SEG # BLD: 9.09 K/UL (ref 1.8–8)
NEUTS SEG NFR BLD: 80 % (ref 32–75)
NRBC # BLD: 0 K/UL (ref 0–0.01)
NRBC BLD-RTO: 0 PER 100 WBC
PLATELET # BLD AUTO: 310 K/UL (ref 150–400)
PMV BLD AUTO: 8.5 FL (ref 8.9–12.9)
POTASSIUM SERPL-SCNC: 3.9 MMOL/L (ref 3.5–5.1)
PROT SERPL-MCNC: 8.1 G/DL (ref 6.4–8.2)
RBC # BLD AUTO: 5.61 M/UL (ref 4.1–5.7)
SODIUM SERPL-SCNC: 140 MMOL/L (ref 136–145)
SPECIMEN HOLD: NORMAL
TROPONIN I SERPL HS-MCNC: 5 NG/L (ref 0–76)
WBC # BLD AUTO: 11.4 K/UL (ref 4.1–11.1)

## 2025-04-15 PROCEDURE — 93010 ELECTROCARDIOGRAM REPORT: CPT | Performed by: INTERNAL MEDICINE

## 2025-04-15 PROCEDURE — 80053 COMPREHEN METABOLIC PANEL: CPT

## 2025-04-15 PROCEDURE — 93005 ELECTROCARDIOGRAM TRACING: CPT | Performed by: STUDENT IN AN ORGANIZED HEALTH CARE EDUCATION/TRAINING PROGRAM

## 2025-04-15 PROCEDURE — 6360000002 HC RX W HCPCS: Performed by: STUDENT IN AN ORGANIZED HEALTH CARE EDUCATION/TRAINING PROGRAM

## 2025-04-15 PROCEDURE — 6370000000 HC RX 637 (ALT 250 FOR IP): Performed by: STUDENT IN AN ORGANIZED HEALTH CARE EDUCATION/TRAINING PROGRAM

## 2025-04-15 PROCEDURE — 84484 ASSAY OF TROPONIN QUANT: CPT

## 2025-04-15 PROCEDURE — 85025 COMPLETE CBC W/AUTO DIFF WBC: CPT

## 2025-04-15 PROCEDURE — 2580000003 HC RX 258: Performed by: STUDENT IN AN ORGANIZED HEALTH CARE EDUCATION/TRAINING PROGRAM

## 2025-04-15 PROCEDURE — 99284 EMERGENCY DEPT VISIT MOD MDM: CPT

## 2025-04-15 PROCEDURE — 70450 CT HEAD/BRAIN W/O DYE: CPT

## 2025-04-15 PROCEDURE — 96374 THER/PROPH/DIAG INJ IV PUSH: CPT

## 2025-04-15 PROCEDURE — 96375 TX/PRO/DX INJ NEW DRUG ADDON: CPT

## 2025-04-15 RX ORDER — TIRZEPATIDE 2.5 MG/.5ML
2.5 INJECTION, SOLUTION SUBCUTANEOUS WEEKLY
COMMUNITY

## 2025-04-15 RX ORDER — SILDENAFIL 25 MG/1
25 TABLET, FILM COATED ORAL PRN
COMMUNITY

## 2025-04-15 RX ORDER — DIPHENHYDRAMINE HYDROCHLORIDE 50 MG/ML
25 INJECTION, SOLUTION INTRAMUSCULAR; INTRAVENOUS
Status: COMPLETED | OUTPATIENT
Start: 2025-04-15 | End: 2025-04-15

## 2025-04-15 RX ORDER — ONDANSETRON 4 MG/1
4 TABLET, ORALLY DISINTEGRATING ORAL 3 TIMES DAILY PRN
Qty: 21 TABLET | Refills: 0 | Status: SHIPPED | OUTPATIENT
Start: 2025-04-15

## 2025-04-15 RX ORDER — DEXAMETHASONE SODIUM PHOSPHATE 10 MG/ML
10 INJECTION, SOLUTION INTRAMUSCULAR; INTRAVENOUS ONCE
Status: COMPLETED | OUTPATIENT
Start: 2025-04-15 | End: 2025-04-15

## 2025-04-15 RX ORDER — OMEPRAZOLE 20 MG/1
20 CAPSULE, DELAYED RELEASE ORAL DAILY
COMMUNITY

## 2025-04-15 RX ORDER — BUTALBITAL, ACETAMINOPHEN AND CAFFEINE 50; 325; 40 MG/1; MG/1; MG/1
1 TABLET ORAL ONCE
Status: COMPLETED | OUTPATIENT
Start: 2025-04-15 | End: 2025-04-15

## 2025-04-15 RX ORDER — LOSARTAN POTASSIUM 50 MG/1
50 TABLET ORAL DAILY
COMMUNITY

## 2025-04-15 RX ORDER — BUTALBITAL, ACETAMINOPHEN AND CAFFEINE 50; 325; 40 MG/1; MG/1; MG/1
1 TABLET ORAL EVERY 4 HOURS PRN
Qty: 30 TABLET | Refills: 0 | Status: SHIPPED | OUTPATIENT
Start: 2025-04-15

## 2025-04-15 RX ORDER — TAMSULOSIN HYDROCHLORIDE 0.4 MG/1
0.4 CAPSULE ORAL DAILY
COMMUNITY

## 2025-04-15 RX ORDER — PROCHLORPERAZINE EDISYLATE 5 MG/ML
10 INJECTION INTRAMUSCULAR; INTRAVENOUS EVERY 6 HOURS PRN
Status: DISCONTINUED | OUTPATIENT
Start: 2025-04-15 | End: 2025-04-15 | Stop reason: HOSPADM

## 2025-04-15 RX ORDER — 0.9 % SODIUM CHLORIDE 0.9 %
1000 INTRAVENOUS SOLUTION INTRAVENOUS ONCE
Status: COMPLETED | OUTPATIENT
Start: 2025-04-15 | End: 2025-04-15

## 2025-04-15 RX ADMIN — SODIUM CHLORIDE 1000 ML: 0.9 INJECTION, SOLUTION INTRAVENOUS at 11:10

## 2025-04-15 RX ADMIN — PROCHLORPERAZINE EDISYLATE 10 MG: 5 INJECTION INTRAMUSCULAR; INTRAVENOUS at 11:11

## 2025-04-15 RX ADMIN — DEXAMETHASONE SODIUM PHOSPHATE 10 MG: 10 INJECTION, SOLUTION INTRAMUSCULAR; INTRAVENOUS at 11:13

## 2025-04-15 RX ADMIN — DIPHENHYDRAMINE HYDROCHLORIDE 25 MG: 50 INJECTION INTRAMUSCULAR; INTRAVENOUS at 11:14

## 2025-04-15 RX ADMIN — BUTALBITAL, ACETAMINOPHEN, AND CAFFEINE 1 TABLET: 325; 50; 40 TABLET ORAL at 11:15

## 2025-04-15 ASSESSMENT — PAIN DESCRIPTION - LOCATION
LOCATION: HEAD
LOCATION_2: CHEST

## 2025-04-15 ASSESSMENT — PAIN DESCRIPTION - INTENSITY: RATING_2: 4

## 2025-04-15 ASSESSMENT — PAIN DESCRIPTION - DESCRIPTORS
DESCRIPTORS: ACHING
DESCRIPTORS_2: TIGHTNESS

## 2025-04-15 ASSESSMENT — PAIN SCALES - GENERAL: PAINLEVEL_OUTOF10: 10

## 2025-04-15 NOTE — ED NOTES
PATIENT DISCHARGED IN STABLE CONDITION PER MD ORDER. INSTRUCTIONS, FOLLOW UP AND MEDICATION DISCUSSED WITH PATIENT. PATIENT VERBALIZED UNDERSTANDING AND HAS NO FURTHER QUESTIONS. AAOX4 NAD NOTED

## 2025-04-15 NOTE — DISCHARGE INSTRUCTIONS
Take the medications for headache and nausea as directed.  Drink plenty of fluids. Return to the ER if symptoms change or worsen or you have any other concerns.  Otherwise follow-up with your primary doctor and consider make an appoint with the neurology clinic using the number provided as soon as possible.

## 2025-04-15 NOTE — ED TRIAGE NOTES
ED triage note: arrrives via [facility] from [facility]. EMS reports patient went to [facility] for a migraine x several days. States while there started having chest pain and nausea. States the chest pain started 40 mins ago. Reports history of migraines that usually only last a day but this one has been since Saturday. States [facility] gave aspirin, unknown dose.

## 2025-04-15 NOTE — ED PROVIDER NOTES
[facility] DEPARTMENT  EMERGENCY DEPARTMENT ENCOUNTER      Pt Name: Curly Prajapati  MRN: [id number]  Birthdate 1985  Date of evaluation: 4/15/2025  Provider: Woo Carbajal MD    CHIEF COMPLAINT       Chief Complaint   Patient presents with    Headache    Chest Pain       HISTORY OF PRESENT ILLNESS    HPI    39-year-old male here for migraine headache.  Reports diffuse head pain radiating into his bilateral ears.  Started 2 or 3 days ago, unknown trigger.  Feels similar but worse and more widespread than his typical migraines.  Took Excedrin without relief.  Went to urgent care was given aspirin and then sent to the ER.    He also reports chest pain that started while at urgent care, describes a mild dull anterior and right sided chest wall pain.  Associate with nausea.    Nursing notes reviewed.    REVIEW OF SYSTEMS     Review of Systems  Unless otherwise stated, a complete review of systems was asked of the patient. Pertinent positives are noted in the HPI section.    PAST MEDICAL HISTORY     Past Medical History:   Diagnosis Date    Hypertension     Ill-defined condition 03/2017    herniated disk    Ill-defined condition 03/2017    T-4 cracks leaking fluid, but no rupture       SURGICAL HISTORY     No past surgical history on file.    CURRENT MEDICATIONS       Discharge Medication List as of 4/15/2025 12:17 PM        CONTINUE these medications which have NOT CHANGED    Details   DICLOFENAC PO Take by mouthHistorical Med      aspirin-acetaminophen-caffeine (EXCEDRIN MIGRAINE) 250-250-65 MG per tablet Take 1 tablet by mouth every 6 hours as needed for HeadachesHistorical Med      losartan (COZAAR) 50 MG tablet Take 1 tablet by mouth dailyHistorical Med      omeprazole (PRILOSEC) 20 MG delayed release capsule Take 1 capsule by mouth dailyHistorical Med      sildenafil (VIAGRA) 25 MG tablet Take 1 tablet by mouth as needed for Erectile DysfunctionHistorical Med      tamsulosin (FLOMAX) 0.4 MG

## 2025-04-19 ENCOUNTER — HOSPITAL ENCOUNTER (EMERGENCY)
Facility: HOSPITAL | Age: 40
Discharge: HOME | End: 2025-04-19
Attending: EMERGENCY MEDICINE
Payer: COMMERCIAL

## 2025-04-19 VITALS
TEMPERATURE: 97.9 F | DIASTOLIC BLOOD PRESSURE: 75 MMHG | HEART RATE: 92 BPM | WEIGHT: 315 LBS | BODY MASS INDEX: 46.65 KG/M2 | SYSTOLIC BLOOD PRESSURE: 113 MMHG | RESPIRATION RATE: 21 BRPM | HEIGHT: 69 IN | OXYGEN SATURATION: 95 %

## 2025-04-19 DIAGNOSIS — R55 SYNCOPE AND COLLAPSE: Primary | ICD-10-CM

## 2025-04-19 DIAGNOSIS — R51.9 NONINTRACTABLE HEADACHE, UNSPECIFIED CHRONICITY PATTERN, UNSPECIFIED HEADACHE TYPE: ICD-10-CM

## 2025-04-19 LAB
ALBUMIN SERPL-MCNC: 4.2 G/DL (ref 3.5–5)
ALBUMIN/GLOB SERPL: 1.1 (ref 1.1–2.2)
ALP SERPL-CCNC: 80 U/L (ref 45–117)
ALT SERPL-CCNC: 28 U/L (ref 12–78)
ANION GAP SERPL CALC-SCNC: 10 MMOL/L (ref 2–12)
AST SERPL-CCNC: 11 U/L (ref 15–37)
BASOPHILS # BLD: 0.04 K/UL (ref 0–0.1)
BASOPHILS NFR BLD: 0.3 % (ref 0–1)
BILIRUB SERPL-MCNC: 0.6 MG/DL (ref 0.2–1)
BUN SERPL-MCNC: 16 MG/DL (ref 6–20)
BUN/CREAT SERPL: 9 (ref 12–20)
CALCIUM SERPL-MCNC: 9.6 MG/DL (ref 8.5–10.1)
CHLORIDE SERPL-SCNC: 100 MMOL/L (ref 97–108)
CO2 SERPL-SCNC: 26 MMOL/L (ref 21–32)
CREAT SERPL-MCNC: 1.81 MG/DL (ref 0.7–1.3)
DIFFERENTIAL METHOD BLD: ABNORMAL
EOSINOPHIL # BLD: 0.05 K/UL (ref 0–0.4)
EOSINOPHIL NFR BLD: 0.3 % (ref 0–7)
ERYTHROCYTE [DISTWIDTH] IN BLOOD BY AUTOMATED COUNT: 13 % (ref 11.5–14.5)
GLOBULIN SER CALC-MCNC: 3.8 G/DL (ref 2–4)
GLUCOSE BLD STRIP.AUTO-MCNC: 113 MG/DL (ref 65–117)
GLUCOSE SERPL-MCNC: 118 MG/DL (ref 65–100)
HCT VFR BLD AUTO: 47.2 % (ref 36.6–50.3)
HGB BLD-MCNC: 16.4 G/DL (ref 12.1–17)
IMM GRANULOCYTES # BLD AUTO: 0.11 K/UL (ref 0–0.04)
IMM GRANULOCYTES NFR BLD AUTO: 0.7 % (ref 0–0.5)
LIPASE SERPL-CCNC: 27 U/L (ref 13–75)
LYMPHOCYTES # BLD: 1.67 K/UL (ref 0.8–3.5)
LYMPHOCYTES NFR BLD: 11.3 % (ref 12–49)
MCH RBC QN AUTO: 29.5 PG (ref 26–34)
MCHC RBC AUTO-ENTMCNC: 34.7 G/DL (ref 30–36.5)
MCV RBC AUTO: 85 FL (ref 80–99)
MONOCYTES # BLD: 0.82 K/UL (ref 0–1)
MONOCYTES NFR BLD: 5.5 % (ref 5–13)
NEUTS SEG # BLD: 12.09 K/UL (ref 1.8–8)
NEUTS SEG NFR BLD: 81.9 % (ref 32–75)
NRBC # BLD: 0 K/UL (ref 0–0.01)
NRBC BLD-RTO: 0 PER 100 WBC
PLATELET # BLD AUTO: 333 K/UL (ref 150–400)
PMV BLD AUTO: 8.6 FL (ref 8.9–12.9)
POTASSIUM SERPL-SCNC: 4.3 MMOL/L (ref 3.5–5.1)
PROT SERPL-MCNC: 8 G/DL (ref 6.4–8.2)
RBC # BLD AUTO: 5.55 M/UL (ref 4.1–5.7)
SERVICE CMNT-IMP: NORMAL
SODIUM SERPL-SCNC: 136 MMOL/L (ref 136–145)
TROPONIN I SERPL HS-MCNC: 44 NG/L (ref 0–76)
WBC # BLD AUTO: 14.8 K/UL (ref 4.1–11.1)

## 2025-04-19 PROCEDURE — 36415 COLL VENOUS BLD VENIPUNCTURE: CPT

## 2025-04-19 PROCEDURE — 6360000002 HC RX W HCPCS: Performed by: EMERGENCY MEDICINE

## 2025-04-19 PROCEDURE — 80053 COMPREHEN METABOLIC PANEL: CPT

## 2025-04-19 PROCEDURE — 96375 TX/PRO/DX INJ NEW DRUG ADDON: CPT

## 2025-04-19 PROCEDURE — 99284 EMERGENCY DEPT VISIT MOD MDM: CPT

## 2025-04-19 PROCEDURE — 85025 COMPLETE CBC W/AUTO DIFF WBC: CPT

## 2025-04-19 PROCEDURE — 83690 ASSAY OF LIPASE: CPT

## 2025-04-19 PROCEDURE — 84484 ASSAY OF TROPONIN QUANT: CPT

## 2025-04-19 PROCEDURE — 96374 THER/PROPH/DIAG INJ IV PUSH: CPT

## 2025-04-19 PROCEDURE — 93005 ELECTROCARDIOGRAM TRACING: CPT | Performed by: EMERGENCY MEDICINE

## 2025-04-19 PROCEDURE — 82962 GLUCOSE BLOOD TEST: CPT

## 2025-04-19 RX ORDER — DIPHENHYDRAMINE HYDROCHLORIDE 50 MG/ML
25 INJECTION, SOLUTION INTRAMUSCULAR; INTRAVENOUS
Status: COMPLETED | OUTPATIENT
Start: 2025-04-19 | End: 2025-04-19

## 2025-04-19 RX ORDER — METOCLOPRAMIDE 10 MG/1
10 TABLET ORAL 4 TIMES DAILY
Qty: 120 TABLET | Refills: 0 | Status: SHIPPED | OUTPATIENT
Start: 2025-04-19

## 2025-04-19 RX ORDER — METOCLOPRAMIDE HYDROCHLORIDE 5 MG/ML
10 INJECTION INTRAMUSCULAR; INTRAVENOUS
Status: COMPLETED | OUTPATIENT
Start: 2025-04-19 | End: 2025-04-19

## 2025-04-19 RX ADMIN — DIPHENHYDRAMINE HYDROCHLORIDE 25 MG: 50 INJECTION INTRAMUSCULAR; INTRAVENOUS at 14:39

## 2025-04-19 RX ADMIN — METOCLOPRAMIDE 10 MG: 5 INJECTION, SOLUTION INTRAMUSCULAR; INTRAVENOUS at 14:39

## 2025-04-19 ASSESSMENT — ENCOUNTER SYMPTOMS
VOMITING: 0
RHINORRHEA: 0
EYE PAIN: 0
SHORTNESS OF BREATH: 0
COLOR CHANGE: 0
ABDOMINAL PAIN: 0
BACK PAIN: 0
DIARRHEA: 0
COUGH: 0
NAUSEA: 0
SORE THROAT: 0

## 2025-04-19 ASSESSMENT — PAIN - FUNCTIONAL ASSESSMENT: PAIN_FUNCTIONAL_ASSESSMENT: 0-10

## 2025-04-19 ASSESSMENT — PAIN SCALES - GENERAL: PAINLEVEL_OUTOF10: 6

## 2025-04-19 ASSESSMENT — PAIN DESCRIPTION - LOCATION: LOCATION: HEAD

## 2025-04-19 NOTE — ED TRIAGE NOTES
Patient arrives via wheelchair, was asked to assist getting of his vehicle. A&0 4. GSC 15. Patients wife states he has been \"blacking out\" at home today and during the drive. Patient denies hitting head, falling today.     Endorses headache, heartburn.   Denies N/D, endorses nausea.     Speech clear.

## 2025-04-19 NOTE — ED PROVIDER NOTES
[facility] EMERGENCY DEPARTMENT  EMERGENCY DEPARTMENT ENCOUNTER      Pt Name: Curly Prajapati  MRN: [id number]  Birthdate 1985  Date of evaluation: 4/19/2025  Provider: Dharmesh Reis MD    CHIEF COMPLAINT       Chief Complaint   Patient presents with    Loss of Consciousness         HISTORY OF PRESENT ILLNESS   (Location/Symptom, Timing/Onset, Context/Setting, Quality, Duration, Modifying Factors, Severity)  Note limiting factors.   Headache /  n/v and syncope.  Similar to previous migraine.  Brief syncope today, now improving.  Syncope today was very brief witnessed by wife.  Patient now returned baseline but has headache.    The history is provided by the patient.   Dizziness  Quality:  Head spinning  Severity:  Moderate  Onset quality:  Gradual  Timing:  Constant  Progression:  Waxing and waning  Chronicity:  Recurrent  Context: not when bending over, not with bowel movement, not with ear pain and not with eye movement    Ineffective treatments:  None tried  Associated symptoms: no chest pain, no diarrhea, no nausea, no shortness of breath and no vomiting          Review of External Medical Records:     Nursing Notes were reviewed.    REVIEW OF SYSTEMS    (2-9 systems for level 4, 10 or more for level 5)     Review of Systems   Constitutional:  Negative for fatigue and fever.   HENT:  Negative for ear pain, rhinorrhea and sore throat.    Eyes:  Negative for pain and visual disturbance.   Respiratory:  Negative for cough and shortness of breath.    Cardiovascular:  Negative for chest pain.   Gastrointestinal:  Negative for abdominal pain, diarrhea, nausea and vomiting.   Genitourinary:  Negative for dysuria.   Musculoskeletal:  Negative for arthralgias, back pain and joint swelling.   Skin:  Negative for color change and rash.   Neurological:  Positive for dizziness. Negative for syncope and numbness.   Psychiatric/Behavioral:  Negative for agitation, behavioral problems, confusion and decreased

## 2025-04-19 NOTE — ED NOTES
Patient resting comfortably on stretcher with family present at bedside. Fluids infusing without difficulty. Call bell within reach. Patient medicated per providers orders, tolerated well.

## 2025-04-19 NOTE — ED NOTES
Patient ambulatory to restroom with steady gait, states he is feeling much better. Provider aware.

## 2025-04-20 LAB
EKG ATRIAL RATE: 107 BPM
EKG DIAGNOSIS: NORMAL
EKG P AXIS: 38 DEGREES
EKG P-R INTERVAL: 138 MS
EKG Q-T INTERVAL: 332 MS
EKG QRS DURATION: 86 MS
EKG QTC CALCULATION (BAZETT): 443 MS
EKG R AXIS: -16 DEGREES
EKG T AXIS: 40 DEGREES
EKG VENTRICULAR RATE: 107 BPM

## 2025-04-20 PROCEDURE — 93010 ELECTROCARDIOGRAM REPORT: CPT | Performed by: HOSPITALIST

## (undated) DEVICE — PROVE COVER: Brand: UNBRANDED

## (undated) DEVICE — WASTE KIT - ST MARY: Brand: MEDLINE INDUSTRIES, INC.

## (undated) DEVICE — KIT MED IMAG CNTRST AGNT W/ IOPAMIDOL REUSE

## (undated) DEVICE — GLIDESHEATH SLENDER ACCESS KIT: Brand: GLIDESHEATH SLENDER

## (undated) DEVICE — KIT HND CTRL 3 W STPCOCK ROT END 54IN PREM HI PRSS TBNG AT

## (undated) DEVICE — ANGIOGRAPHY KIT

## (undated) DEVICE — CATH 5F 100CM JL35 -- DXTERITY

## (undated) DEVICE — CATH 5F 100CM JR40 -- DXTERITY

## (undated) DEVICE — PACK PROCEDURE SURG HRT CATH

## (undated) DEVICE — TR BAND RADIAL ARTERY COMPRESSION DEVICE: Brand: TR BAND

## (undated) DEVICE — KIT MFLD ISOLATN NACL CNTRST PRT TBNG SPIK W/ PRSS TRNSDUC

## (undated) DEVICE — SPLINT WR POS F/ARTERIAL ACC -- BX/10

## (undated) DEVICE — DRAPE OPHTH 4 PLY SGL FEN

## (undated) DEVICE — HI-TORQUE VERSACORE MODIFIED J GUIDE WIRE SYSTEM 145 CM: Brand: HI-TORQUE VERSACORE